# Patient Record
Sex: MALE | Race: WHITE | Employment: FULL TIME | URBAN - METROPOLITAN AREA
[De-identification: names, ages, dates, MRNs, and addresses within clinical notes are randomized per-mention and may not be internally consistent; named-entity substitution may affect disease eponyms.]

---

## 2017-07-17 ENCOUNTER — HOSPITAL ENCOUNTER (OUTPATIENT)
Dept: MRI IMAGING | Age: 52
Discharge: HOME | End: 2017-07-17
Attending: NEUROLOGICAL SURGERY
Payer: COMMERCIAL

## 2017-07-17 VITALS — BODY MASS INDEX: 27.18 KG/M2 | WEIGHT: 206 LBS

## 2017-07-17 DIAGNOSIS — D32.9 MENINGIOMA (HCC): ICD-10-CM

## 2017-07-17 PROCEDURE — 70553 MRI BRAIN STEM W/O & W/DYE: CPT

## 2017-07-17 PROCEDURE — A9577 INJ MULTIHANCE: HCPCS | Performed by: NEUROLOGICAL SURGERY

## 2017-07-17 PROCEDURE — 74011250636 HC RX REV CODE- 250/636: Performed by: NEUROLOGICAL SURGERY

## 2017-07-17 RX ORDER — SODIUM CHLORIDE 0.9 % (FLUSH) 0.9 %
10 SYRINGE (ML) INJECTION
Status: COMPLETED | OUTPATIENT
Start: 2017-07-17 | End: 2017-07-17

## 2017-07-17 RX ADMIN — Medication 10 ML: at 10:20

## 2017-07-17 RX ADMIN — GADOBENATE DIMEGLUMINE 20 ML: 529 INJECTION, SOLUTION INTRAVENOUS at 10:20

## 2017-07-31 NOTE — PROGRESS NOTES
Pt showed up here at OP rad area today and stated he talked with his dr about tingling and numbness  In his hand and complained of some hardening of the vein that was used for IV contrast for his mri 2 weeks ago  I called RAD and he agreed with his doctors diagnosis and to continue watch and do  warm compresses- there was no redness, swelling, bruising, or heat to area. - nss  OMARI Driver looked at area in ER triage since patient requested someone look at the area

## 2018-06-08 ENCOUNTER — HOSPITAL ENCOUNTER (OUTPATIENT)
Dept: MRI IMAGING | Age: 53
Discharge: HOME | End: 2018-06-08
Attending: UROLOGY
Payer: COMMERCIAL

## 2018-06-08 DIAGNOSIS — R97.20 ELEVATED PSA: ICD-10-CM

## 2018-06-08 LAB — CREAT BLD-MCNC: 1.2 MG/DL (ref 0.8–1.5)

## 2018-06-08 PROCEDURE — 82565 ASSAY OF CREATININE: CPT

## 2018-06-08 PROCEDURE — 74011000258 HC RX REV CODE- 258: Performed by: UROLOGY

## 2018-06-08 PROCEDURE — A9577 INJ MULTIHANCE: HCPCS | Performed by: UROLOGY

## 2018-06-08 PROCEDURE — 72197 MRI PELVIS W/O & W/DYE: CPT

## 2018-06-08 PROCEDURE — 74011250636 HC RX REV CODE- 250/636: Performed by: UROLOGY

## 2018-06-08 RX ORDER — SODIUM CHLORIDE 0.9 % (FLUSH) 0.9 %
10 SYRINGE (ML) INJECTION
Status: COMPLETED | OUTPATIENT
Start: 2018-06-08 | End: 2018-06-08

## 2018-06-08 RX ADMIN — Medication 10 ML: at 10:22

## 2018-06-08 RX ADMIN — SODIUM CHLORIDE 100 ML: 900 INJECTION, SOLUTION INTRAVENOUS at 10:22

## 2018-06-08 RX ADMIN — GADOBENATE DIMEGLUMINE 10 ML: 529 INJECTION, SOLUTION INTRAVENOUS at 10:22

## 2018-06-08 NOTE — PROGRESS NOTES
Jean Paul Castelan, let patient know there are 2 areas in the prostate that are suspicious for prostate cancer on the new mri. I recommend biopsy of the areas in the OR with [facility]. Fredy Watt,  If patient desires, this could be added to the 2 I already have next Wednesday.

## 2018-06-12 ENCOUNTER — ANESTHESIA EVENT (OUTPATIENT)
Dept: SURGERY | Age: 53
End: 2018-06-12
Payer: COMMERCIAL

## 2018-06-12 RX ORDER — SODIUM CHLORIDE 0.9 % (FLUSH) 0.9 %
5-10 SYRINGE (ML) INJECTION AS NEEDED
Status: CANCELLED | OUTPATIENT
Start: 2018-06-12

## 2018-06-12 RX ORDER — SODIUM CHLORIDE 0.9 % (FLUSH) 0.9 %
5-10 SYRINGE (ML) INJECTION EVERY 8 HOURS
Status: CANCELLED | OUTPATIENT
Start: 2018-06-12

## 2018-06-13 ENCOUNTER — HOSPITAL ENCOUNTER (OUTPATIENT)
Age: 53
Setting detail: OUTPATIENT SURGERY
Discharge: HOME | End: 2018-06-13
Attending: UROLOGY | Admitting: UROLOGY
Payer: COMMERCIAL

## 2018-06-13 ENCOUNTER — ANESTHESIA (OUTPATIENT)
Dept: SURGERY | Age: 53
End: 2018-06-13
Payer: COMMERCIAL

## 2018-06-13 VITALS
TEMPERATURE: 97.9 F | DIASTOLIC BLOOD PRESSURE: 73 MMHG | HEIGHT: 73 IN | SYSTOLIC BLOOD PRESSURE: 126 MMHG | BODY MASS INDEX: 27.2 KG/M2 | WEIGHT: 205.25 LBS | OXYGEN SATURATION: 100 % | HEART RATE: 74 BPM | RESPIRATION RATE: 16 BRPM

## 2018-06-13 LAB
ANION GAP SERPL CALC-SCNC: 9 MMOL/L (ref 7–16)
BUN SERPL-MCNC: 19 MG/DL (ref 6–23)
CALCIUM SERPL-MCNC: 9.1 MG/DL (ref 8.3–10.4)
CHLORIDE SERPL-SCNC: 109 MMOL/L (ref 98–107)
CO2 SERPL-SCNC: 25 MMOL/L (ref 21–32)
CREAT SERPL-MCNC: 1.2 MG/DL (ref 0.8–1.5)
ERYTHROCYTE [DISTWIDTH] IN BLOOD BY AUTOMATED COUNT: 12.6 % (ref 11.9–14.6)
GLUCOSE SERPL-MCNC: 116 MG/DL (ref 65–100)
HCT VFR BLD AUTO: 41 % (ref 41.1–50.3)
HGB BLD-MCNC: 14.1 G/DL (ref 13.6–17.2)
MCH RBC QN AUTO: 30 PG (ref 26.1–32.9)
MCHC RBC AUTO-ENTMCNC: 34.4 G/DL (ref 31.4–35)
MCV RBC AUTO: 87.2 FL (ref 79.6–97.8)
PLATELET # BLD AUTO: 288 K/UL (ref 150–450)
PMV BLD AUTO: 10.5 FL (ref 10.8–14.1)
POTASSIUM SERPL-SCNC: 4 MMOL/L (ref 3.5–5.1)
RBC # BLD AUTO: 4.7 M/UL (ref 4.23–5.67)
SODIUM SERPL-SCNC: 143 MMOL/L (ref 136–145)
WBC # BLD AUTO: 5.2 K/UL (ref 4.3–11.1)

## 2018-06-13 PROCEDURE — 77030032490 HC SLV COMPR SCD KNE COVD -B: Performed by: UROLOGY

## 2018-06-13 PROCEDURE — 76210000021 HC REC RM PH II 0.5 TO 1 HR: Performed by: UROLOGY

## 2018-06-13 PROCEDURE — 74011250636 HC RX REV CODE- 250/636: Performed by: ANESTHESIOLOGY

## 2018-06-13 PROCEDURE — 74011250636 HC RX REV CODE- 250/636

## 2018-06-13 PROCEDURE — 88305 TISSUE EXAM BY PATHOLOGIST: CPT | Performed by: UROLOGY

## 2018-06-13 PROCEDURE — 77030020143 HC AIRWY LARYN INTUB CGAS -A: Performed by: ANESTHESIOLOGY

## 2018-06-13 PROCEDURE — 80048 BASIC METABOLIC PNL TOTAL CA: CPT | Performed by: UROLOGY

## 2018-06-13 PROCEDURE — 76210000006 HC OR PH I REC 0.5 TO 1 HR: Performed by: UROLOGY

## 2018-06-13 PROCEDURE — 85027 COMPLETE CBC AUTOMATED: CPT | Performed by: UROLOGY

## 2018-06-13 PROCEDURE — 74011250636 HC RX REV CODE- 250/636: Performed by: UROLOGY

## 2018-06-13 PROCEDURE — 74011000250 HC RX REV CODE- 250

## 2018-06-13 PROCEDURE — 76060000033 HC ANESTHESIA 1 TO 1.5 HR: Performed by: UROLOGY

## 2018-06-13 PROCEDURE — 77030003481 HC NDL BIOP GUN BARD -B: Performed by: UROLOGY

## 2018-06-13 PROCEDURE — 77030020782 HC GWN BAIR PAWS FLX 3M -B: Performed by: ANESTHESIOLOGY

## 2018-06-13 PROCEDURE — 76010000138 HC OR TIME 0.5 TO 1 HR: Performed by: UROLOGY

## 2018-06-13 RX ORDER — SODIUM CHLORIDE 0.9 % (FLUSH) 0.9 %
5-10 SYRINGE (ML) INJECTION AS NEEDED
Status: DISCONTINUED | OUTPATIENT
Start: 2018-06-13 | End: 2018-06-13 | Stop reason: HOSPADM

## 2018-06-13 RX ORDER — HYDROMORPHONE HYDROCHLORIDE 2 MG/ML
0.5 INJECTION, SOLUTION INTRAMUSCULAR; INTRAVENOUS; SUBCUTANEOUS
Status: DISCONTINUED | OUTPATIENT
Start: 2018-06-13 | End: 2018-06-13 | Stop reason: HOSPADM

## 2018-06-13 RX ORDER — FENTANYL CITRATE 50 UG/ML
INJECTION, SOLUTION INTRAMUSCULAR; INTRAVENOUS AS NEEDED
Status: DISCONTINUED | OUTPATIENT
Start: 2018-06-13 | End: 2018-06-13 | Stop reason: HOSPADM

## 2018-06-13 RX ORDER — MIDAZOLAM HYDROCHLORIDE 1 MG/ML
2 INJECTION, SOLUTION INTRAMUSCULAR; INTRAVENOUS
Status: DISCONTINUED | OUTPATIENT
Start: 2018-06-13 | End: 2018-06-13 | Stop reason: HOSPADM

## 2018-06-13 RX ORDER — NALBUPHINE HYDROCHLORIDE 20 MG/ML
5 INJECTION, SOLUTION INTRAMUSCULAR; INTRAVENOUS; SUBCUTANEOUS
Status: DISCONTINUED | OUTPATIENT
Start: 2018-06-13 | End: 2018-06-13 | Stop reason: HOSPADM

## 2018-06-13 RX ORDER — SODIUM CHLORIDE, SODIUM LACTATE, POTASSIUM CHLORIDE, CALCIUM CHLORIDE 600; 310; 30; 20 MG/100ML; MG/100ML; MG/100ML; MG/100ML
75 INJECTION, SOLUTION INTRAVENOUS CONTINUOUS
Status: DISCONTINUED | OUTPATIENT
Start: 2018-06-13 | End: 2018-06-13 | Stop reason: HOSPADM

## 2018-06-13 RX ORDER — DEXAMETHASONE SODIUM PHOSPHATE 4 MG/ML
INJECTION, SOLUTION INTRA-ARTICULAR; INTRALESIONAL; INTRAMUSCULAR; INTRAVENOUS; SOFT TISSUE AS NEEDED
Status: DISCONTINUED | OUTPATIENT
Start: 2018-06-13 | End: 2018-06-13 | Stop reason: HOSPADM

## 2018-06-13 RX ORDER — SODIUM CHLORIDE, SODIUM LACTATE, POTASSIUM CHLORIDE, CALCIUM CHLORIDE 600; 310; 30; 20 MG/100ML; MG/100ML; MG/100ML; MG/100ML
100 INJECTION, SOLUTION INTRAVENOUS CONTINUOUS
Status: DISCONTINUED | OUTPATIENT
Start: 2018-06-13 | End: 2018-06-13 | Stop reason: HOSPADM

## 2018-06-13 RX ORDER — LIDOCAINE HYDROCHLORIDE 20 MG/ML
INJECTION, SOLUTION EPIDURAL; INFILTRATION; INTRACAUDAL; PERINEURAL AS NEEDED
Status: DISCONTINUED | OUTPATIENT
Start: 2018-06-13 | End: 2018-06-13 | Stop reason: HOSPADM

## 2018-06-13 RX ORDER — EPHEDRINE SULFATE 50 MG/ML
INJECTION, SOLUTION INTRAVENOUS AS NEEDED
Status: DISCONTINUED | OUTPATIENT
Start: 2018-06-13 | End: 2018-06-13 | Stop reason: HOSPADM

## 2018-06-13 RX ORDER — ONDANSETRON 2 MG/ML
INJECTION INTRAMUSCULAR; INTRAVENOUS AS NEEDED
Status: DISCONTINUED | OUTPATIENT
Start: 2018-06-13 | End: 2018-06-13 | Stop reason: HOSPADM

## 2018-06-13 RX ORDER — PROPOFOL 10 MG/ML
INJECTION, EMULSION INTRAVENOUS AS NEEDED
Status: DISCONTINUED | OUTPATIENT
Start: 2018-06-13 | End: 2018-06-13 | Stop reason: HOSPADM

## 2018-06-13 RX ORDER — FLUMAZENIL 0.1 MG/ML
0.2 INJECTION INTRAVENOUS
Status: DISCONTINUED | OUTPATIENT
Start: 2018-06-13 | End: 2018-06-13 | Stop reason: HOSPADM

## 2018-06-13 RX ORDER — CIPROFLOXACIN 2 MG/ML
400 INJECTION, SOLUTION INTRAVENOUS ONCE
Status: COMPLETED | OUTPATIENT
Start: 2018-06-13 | End: 2018-06-13

## 2018-06-13 RX ORDER — LIDOCAINE HYDROCHLORIDE 10 MG/ML
0.1 INJECTION INFILTRATION; PERINEURAL AS NEEDED
Status: DISCONTINUED | OUTPATIENT
Start: 2018-06-13 | End: 2018-06-13 | Stop reason: HOSPADM

## 2018-06-13 RX ORDER — OXYCODONE HYDROCHLORIDE 5 MG/1
5 TABLET ORAL
Status: DISCONTINUED | OUTPATIENT
Start: 2018-06-13 | End: 2018-06-13 | Stop reason: HOSPADM

## 2018-06-13 RX ORDER — NALOXONE HYDROCHLORIDE 0.4 MG/ML
0.1 INJECTION, SOLUTION INTRAMUSCULAR; INTRAVENOUS; SUBCUTANEOUS
Status: DISCONTINUED | OUTPATIENT
Start: 2018-06-13 | End: 2018-06-13 | Stop reason: HOSPADM

## 2018-06-13 RX ADMIN — PROPOFOL 200 MG: 10 INJECTION, EMULSION INTRAVENOUS at 12:39

## 2018-06-13 RX ADMIN — SODIUM CHLORIDE, SODIUM LACTATE, POTASSIUM CHLORIDE, AND CALCIUM CHLORIDE 100 ML/HR: 600; 310; 30; 20 INJECTION, SOLUTION INTRAVENOUS at 10:28

## 2018-06-13 RX ADMIN — EPHEDRINE SULFATE 10 MG: 50 INJECTION, SOLUTION INTRAVENOUS at 13:05

## 2018-06-13 RX ADMIN — FENTANYL CITRATE 50 MCG: 50 INJECTION, SOLUTION INTRAMUSCULAR; INTRAVENOUS at 12:39

## 2018-06-13 RX ADMIN — ONDANSETRON 4 MG: 2 INJECTION INTRAMUSCULAR; INTRAVENOUS at 12:59

## 2018-06-13 RX ADMIN — EPHEDRINE SULFATE 10 MG: 50 INJECTION, SOLUTION INTRAVENOUS at 12:54

## 2018-06-13 RX ADMIN — DEXAMETHASONE SODIUM PHOSPHATE 4 MG: 4 INJECTION, SOLUTION INTRA-ARTICULAR; INTRALESIONAL; INTRAMUSCULAR; INTRAVENOUS; SOFT TISSUE at 12:59

## 2018-06-13 RX ADMIN — CIPROFLOXACIN 400 MG: 2 INJECTION, SOLUTION INTRAVENOUS at 12:33

## 2018-06-13 RX ADMIN — LIDOCAINE HYDROCHLORIDE 80 MG: 20 INJECTION, SOLUTION EPIDURAL; INFILTRATION; INTRACAUDAL; PERINEURAL at 12:39

## 2018-06-13 NOTE — BRIEF OP NOTE
BRIEF OPERATIVE NOTE    Date of Procedure: 6/13/2018   Preoperative Diagnosis: Elevated PSA [R97.20] and mri prostate lesion  Postoperative Diagnosis: same  Procedure(s):  MRI FUSION PROSTATE BIOPSY / TRANSRECTAL ULTRASOUND  Surgeon(s) and Role:     * Shahab Canales MD - Primary         Surgical Assistant: none    Surgical Staff:  Circ-1: Jose Cisneros RN; Jade Monteiro RN  Circ-2: Jade Monteiro RN  Circ-Relief: Kalie Staton; Marivel Irving  Event Time In   Incision Start 1250   Incision Close 1324     Anesthesia: General   Estimated Blood Loss: minimal  Specimens:   ID Type Source Tests Collected by Time Destination   1 : Left base mri lesion Preservative Prostate  Shahab Canales MD 6/13/2018  Pathology   2 : left mid mri lesion  Preservative Prostate  Shahab Canales MD 6/13/2018 1302 Pathology   3 : right mid mri lesion Preservative Prostate  Shahab Canales MD 6/13/2018 1302 Pathology   4 : left apex    Shahab Canales MD 6/13/2018 1318 Pathology   5 : right apex Preservative Prostate  Shahab Canales MD 6/13/2018 1320 Pathology   6 : right base Preservative Shanique Canales MD 6/13/2018 1320 Pathology      Findings: see dictation   Complications: none apparent  Implants: * No implants in log *

## 2018-06-13 NOTE — IP AVS SNAPSHOT
Tres Krishnamurthy 
 
 
 [address] [facility] [address] 
[phone] Patient: Edna Klinefelter. MRN: [id number] MUP:[birth year] A check dominic indicates which time of day the medication should be taken. My Medications CONTINUE taking these medications Instructions Each Dose to Equal  
 Morning Noon Evening Bedtime ANTARA 130 mg capsule Generic drug:  fenofibrate micronized Your last dose was: Your next dose is: Take 130 mg by mouth every morning. 130 mg  
    
   
   
   
  
 aspirin, buffered 81 mg Tab Your last dose was: Your next dose is: Take 81 mg by mouth daily. Pt has been holding 81 mg  
    
   
   
   
  
 ciprofloxacin HCl 500 mg tablet Commonly known as:  CIPRO Your last dose was: Your next dose is: Take 1 Tab by mouth two (2) times a day for 14 days. 500 mg CLARITIN 10 mg tablet Generic drug:  loratadine Your last dose was: Your next dose is: Take 10 mg by mouth daily as needed for Allergies. 10 mg  
    
   
   
   
  
 clonazePAM 1 mg tablet Commonly known as:  Waynard Beltran Your last dose was: Your next dose is: Take 1 mg by mouth nightly. 1 mg EXCEDRIN EXTRA STRENGTH 250-250-65 mg per tablet Generic drug:  aspirin-acetaminophen-caffeine Your last dose was: Your next dose is: Take 1 Tab by mouth every six (6) hours as needed for Pain. 1 Tab FIORICET -40 mg per tablet Generic drug:  butalbital-acetaminophen-caffeine Your last dose was: Your next dose is: Take 1 Tab by mouth as needed for Pain. 1 Tab FLONASE 50 mcg/actuation nasal spray Generic drug:  fluticasone Your last dose was: Your next dose is: 2 Sprays as needed. 2 Spray  
    
   
   
   
  
 ibuprofen 200 mg tablet Commonly known as:  MOTRIN Your last dose was: Your next dose is: Take  by mouth as needed for Pain.  
     
   
   
   
  
 losartan 50 mg tablet Commonly known as:  COZAAR Your last dose was: Your next dose is: Take 50 mg by mouth daily. 50 mg  
    
   
   
   
  
 metoprolol succinate 25 mg XL tablet Commonly known as:  TOPROL-XL Your last dose was: Your next dose is: Take 12.5 mg by mouth daily. 12.5 mg  
    
   
   
   
  
 montelukast 10 mg tablet Commonly known as:  SINGULAIR Your last dose was: Your next dose is: Take 10 mg by mouth daily. 10 mg  
    
   
   
   
  
 MULTIVITAMIN PO Your last dose was: Your next dose is: Take 1 Tab by mouth every morning. 1 Tab OTHER(NON-FORMULARY) Your last dose was: Your next dose is:    
   
   
 1 Cap every morning. RESERVOR - ina extract 1 Cap PROBIOTIC PO Your last dose was: Your next dose is: Take 1 Cap by mouth every other day. Takes 1 capsule three times a week. 1 Cap  
    
   
   
   
  
 saw palmetto 160 mg Cap Your last dose was: Your next dose is: Take  by mouth daily. SYNTHROID 150 mcg tablet Generic drug:  levothyroxine Your last dose was: Your next dose is: Take  by mouth Daily (before breakfast).

## 2018-06-13 NOTE — DISCHARGE INSTRUCTIONS
Prostate Biopsy and Ultrasound: About This Test  What is it? A prostate biopsy is a type of test. Your doctor takes small tissue samples from your prostate gland. Then another doctor looks at the tissue under a microscope to see if there are cancer cells. This test is done by a doctor who specializes in men's genital and urinary problems (urologist). It can be done in your doctor's office, a day surgery clinic, or a hospital operating room. To get the tissue samples from the prostate, the doctor inserts a thin needle through the rectum, the urethra, or the area between the anus and scrotum (perineum). The most common method is through the rectum. Your doctor may use ultrasound to help guide the needle. Why is this test done? You may need a prostate biopsy if your doctor found something of concern during a digital rectal exam. Or you may need it if a blood test showed a high level of prostate-specific antigen (PSA). A biopsy can help find out if you have prostate cancer. How can you prepare for this test?  Before you have a prostate biopsy, tell your doctor if:  · You are taking any medicines or using any herbal supplements. · You are allergic to any medicines. · You have had bleeding problems, or you take aspirin or some other blood thinner. What happens before the test?  · You may need to have an enema before the test.  · You will need to take off all or most of your clothes. You will be given a cloth or paper gown to use during the test.  · You may be given a sedative through a vein (IV) in your arm. The sedative will help you relax and stay still. What happens during the test?  Through the rectum  · You may be asked to kneel, lie on your side, or lie on your back. · Your doctor may inject an anesthetic around the prostate to numb the area before the sample is taken. · Ultrasound is often used to guide the needle to the correct spot. · Your doctor may choose to use a needle guide for the biopsy. He or she will attach the guide to a finger. Your doctor will insert the finger into your rectum. · The needle will enter the prostate and take 6 to 12 samples. Through the urethra  · You will lie on your back. Your feet will rest in stirrups. · You will get anesthesia. The anesthesia may make you sleep. Or it may just numb the area being worked on.  · Your doctor will insert a lighted scope (cystoscope) into your urethra. The scope allows your doctor to look directly at the prostate. Your doctor will pass a cutting loop through the scope to remove samples of prostate tissue. Through the perineum  · You will lie on an exam table either on your side or on your back with your knees bent. You will get anesthesia. The anesthesia may make you sleep. Or it may just numb the area being worked on.  · Your doctor will make a small cut in your perineum. Then he or she will insert a finger into the rectum to hold the prostate. · Your doctor will then insert the needle through the cut and into the prostate. · The needle collects samples of tissue and is then pulled out. What else should you know about this test?  · A prostate biopsy has a slight risk of causing problems such as infection or bleeding. · If the biopsy went through your rectum, you may have a small amount of bleeding from your rectum for 2 to 3 days after the biopsy. · You may have a little pain in your pelvic area. You may also have a little blood in your urine for 1 to 5 days. · You may have some blood in your semen for a week or longer. How long will the test take? · This test will take about 15 to 45 minutes. What happens after the test?  Your doctor will tell you what to expect and watch for after your test. In general:  · If you have anesthesia that makes you sleep, you will be in a recovery room for a few hours. You will need someone to drive you home. You may feel tired for the rest of the day.   · You will probably be able to go home right away.  · If your doctor had you stop taking any medicine for the biopsy, ask him or her when you can start taking it again. If you were given antibiotics, take them as directed. · Do not do heavy work or exercise for 4 hours after the test.  · Your doctor will tell you how long it may take to get your results back. Follow-up care is a key part of your treatment and safety. Be sure to make and go to all appointments, and call your doctor if you are having problems. It's also a good idea to keep a list of the medicines you take. Ask your doctor when you can expect to have your test results. Where can you learn more? Go to http://chetna-radha.info/. Enter Y504 in the search box to learn more about \"Prostate Biopsy and Ultrasound: About This Test.\"  Current as of: May 12, 2017  Content Version: 11.4  © 2637-0416 [employer]. Care instructions adapted under license by [employer] (which disclaims liability or warranty for this information). If you have questions about a medical condition or this instruction, always ask your healthcare professional. [place] [address] any warranty or liability for your use of this information. DIET  · Clear liquids until no nausea or vomiting; then light diet for the first day. · Advance to regular diet on second day, unless your doctor orders otherwise. · If nausea and vomiting continues, call your doctor. PAIN  · Take pain medication as directed by your doctor. · Call your doctor if pain is NOT relieved by medication. AFTER ANESTHESIA   · For the first 24 hours: DO NOT Drive, Drink alcoholic beverages, or Make important decisions. · Be aware of dizziness following anesthesia and while taking pain medication.      APPOINTMENT DATE/ TIME   His office will call you     YOUR DOCTOR'S PHONE NUMBER [phone]      DISCHARGE SUMMARY from Nurse    PATIENT INSTRUCTIONS:    After general anesthesia or intravenous sedation, for 24 hours or while taking prescription Narcotics:  · Limit your activities  · Do not drive and operate hazardous machinery  · Do not make important personal or business decisions  · Do  not drink alcoholic beverages  · If you have not urinated within 8 hours after discharge, please contact your surgeon on call. *  Please give a list of your current medications to your Primary Care Provider. *  Please update this list whenever your medications are discontinued, doses are      changed, or new medications (including over-the-counter products) are added. *  Please carry medication information at all times in case of emergency situations. These are general instructions for a healthy lifestyle:    No smoking/ No tobacco products/ Avoid exposure to second hand smoke    Surgeon General's Warning:  Quitting smoking now greatly reduces serious risk to your health. Obesity, smoking, and sedentary lifestyle greatly increases your risk for illness    A healthy diet, regular physical exercise & weight monitoring are important for maintaining a healthy lifestyle    You may be retaining fluid if you have a history of heart failure or if you experience any of the following symptoms:  Weight gain of 3 pounds or more overnight or 5 pounds in a week, increased swelling in our hands or feet or shortness of breath while lying flat in bed. Please call your doctor as soon as you notice any of these symptoms; do not wait until your next office visit. Recognize signs and symptoms of STROKE:    F-face looks uneven    A-arms unable to move or move unevenly    S-speech slurred or non-existent    T-time-call 911 as soon as signs and symptoms begin-DO NOT go       Back to bed or wait to see if you get better-TIME IS BRAIN.

## 2018-06-13 NOTE — OP NOTES
[facility]  OPERATIVE REPORT    Louisa Eubanks  MR#: [id number]  : 1965  ACCOUNT #: [de-identified]   DATE OF SERVICE: 2018    SURGEON:  Taiwo Escobar MD    ASSISTANT:  None. PREOPERATIVE DIAGNOSIS:  Elevated PSA and prostate lesion on MRI. POSTOPERATIVE DIAGNOSIS:  Elevated PSA and prostate lesion on MRI. PROCEDURE PERFORMED:  MRI fusion ultrasound-guided prostate biopsy using UroNav. ANESTHESIA:  General.    ESTIMATED BLOOD LOSS:  Minimal.    COMPLICATIONS:  None apparent. 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 He had a negative prostate biopsy 10/20/2016. His PSA at that time was approximately 5.4. More recently it had elevated in 2018 to 5.7, leading to an MRI of the prostate being performed on 2018 which showed a PI-RADS 4 lesion as well as 2 PI-RADS 3 lesions, and secondary to that he was taken to the operating room today for the above-named procedure. FINDINGS:  The patient's prostate was in the 30-35 g range. TECHNIQUE:  The patient was taken to the operating room and placed in supine position. Anesthesia was induced via anesthesiology service. He was repositioned in the lithotomy position and prepped and draped in sterile surgical fashion with the genitalia in a sterile field. A digital rectal exam was performed with no nodularity of the prostate being palpable. I then inserted the transrectal ultrasound probe, scanned the prostate for the UroNav device, and once it was ready, we took biopsies of the 3 areas of question. These were from the left and right mid areas as well as the left base. These cores were labeled MRI lesion 1, 2 and 3. I then took 3 more biopsies, finishing up a sextant pattern. These were from the right base and left and right apex. After the 6 biopsies had been taken, the probe was removed.   The patient was taken to the PACU in stable condition. Plan will be for him to follow up to go over the biopsy results when they are available. SPECIMENS:  Prostate biopsies. IMPLANTS:  None.       MD ELAINE Cortes / Michelet Dawkins  D: 06/13/2018 13:35     T: 06/13/2018 14:22  JOB #: [id number]

## 2018-06-13 NOTE — ANESTHESIA POSTPROCEDURE EVALUATION
Post-Anesthesia Evaluation and Assessment    Patient: Belen Peres. MRN: [id number]  SSN: [id number]    YOB: 1965  Age: 48 y.o. Sex: male       Cardiovascular Function/Vital Signs  Visit Vitals    /68    Pulse 75    Temp 36.6 °C (97.9 °F)    Resp 16    Ht 6' 1\" (1.854 m)    Wt 93.1 kg (205 lb 4 oz)    SpO2 100%    BMI 27.08 kg/m2       Patient is status post general anesthesia for Procedure(s):  MRI FUSION PROSTATE BIOPSY / TRANSRECTAL ULTRASOUND. Nausea/Vomiting: None    Postoperative hydration reviewed and adequate. Pain:  Pain Scale 1: Visual (06/13/18 1334)  Pain Intensity 1: 0 (06/13/18 1334)   Managed    Neurological Status:   Neuro (WDL): Exceptions to WDL (06/13/18 1334)  Neuro  Neurologic State: Drowsy;Sleeping (06/13/18 1334)   At baseline    Mental Status and Level of Consciousness: Arousable    Pulmonary Status:   O2 Device: Nasal cannula (06/13/18 1334)   Adequate oxygenation and airway patent    Complications related to anesthesia: None    Post-anesthesia assessment completed.  No concerns    Signed By: Gayle Rodriguez MD     June 13, 2018

## 2018-06-13 NOTE — ANESTHESIA PREPROCEDURE EVALUATION
Anesthetic History   No history of anesthetic complications            Review of Systems / Medical History  Patient summary reviewed and pertinent labs reviewed    Pulmonary        Sleep apnea: No treatment           Neuro/Psych             Comments: H/o crainiotomy for meningioma in 2010 Cardiovascular    Hypertension: well controlled          Hyperlipidemia    Exercise tolerance: >4 METS  Comments: Denies recent CP, SOB or Palpitations   GI/Hepatic/Renal     GERD: well controlled           Endo/Other      Hypothyroidism: well controlled  Arthritis     Other Findings            Physical Exam    Airway  Mallampati: II  TM Distance: 4 - 6 cm  Neck ROM: normal range of motion   Mouth opening: Normal     Cardiovascular  Regular rate and rhythm,  S1 and S2 normal,  no murmur, click, rub, or gallop             Dental  No notable dental hx       Pulmonary  Breath sounds clear to auscultation               Abdominal  GI exam deferred       Other Findings            Anesthetic Plan    ASA: 2  Anesthesia type: general          Induction: Intravenous  Anesthetic plan and risks discussed with: Patient and Spouse

## 2018-06-13 NOTE — IP AVS SNAPSHOT
[address] [address] 
[phone] Patient: Chani Noriega. MRN: [id number] [facility]:[birth year] About your hospitalization You were admitted on:  June 13, 2018 You last received care in the:  [facility] PACU You were discharged on:  June 13, 2018 Why you were hospitalized Your primary diagnosis was:  Not on File Follow-up Information Follow up With Details Comments Contact Info Vielka Garcia, [address] [facility] 
[phone] Les Buckner MD  his [facility] will call you [facility] [address] [address] 
[phone] Discharge Orders None A check odminic indicates which time of day the medication should be taken. My Medications CONTINUE taking these medications Instructions Each Dose to Equal  
 Morning Noon Evening Bedtime ANTARA 130 mg capsule Generic drug:  fenofibrate micronized Your last dose was: Your next dose is: Take 130 mg by mouth every morning. 130 mg  
    
   
   
   
  
 aspirin, buffered 81 mg Tab Your last dose was: Your next dose is: Take 81 mg by mouth daily. Pt has been holding 81 mg  
    
   
   
   
  
 ciprofloxacin HCl 500 mg tablet Commonly known as:  CIPRO Your last dose was: Your next dose is: Take 1 Tab by mouth two (2) times a day for 14 days. 500 mg CLARITIN 10 mg tablet Generic drug:  loratadine Your last dose was: Your next dose is: Take 10 mg by mouth daily as needed for Allergies. 10 mg  
    
   
   
   
  
 clonazePAM 1 mg tablet Commonly known as:  Evlyn Ángel Your last dose was: Your next dose is: Take 1 mg by mouth nightly. 1 mg EXCEDRIN EXTRA STRENGTH 250-250-65 mg per tablet Generic drug:  aspirin-acetaminophen-caffeine Your last dose was: Your next dose is: Take 1 Tab by mouth every six (6) hours as needed for Pain. 1 Tab FIORICET -40 mg per tablet Generic drug:  butalbital-acetaminophen-caffeine Your last dose was: Your next dose is: Take 1 Tab by mouth as needed for Pain. 1 Tab FLONASE 50 mcg/actuation nasal spray Generic drug:  fluticasone Your last dose was: Your next dose is: 2 Sprays as needed. 2 Spray  
    
   
   
   
  
 ibuprofen 200 mg tablet Commonly known as:  MOTRIN Your last dose was: Your next dose is: Take  by mouth as needed for Pain.  
     
   
   
   
  
 losartan 50 mg tablet Commonly known as:  COZAAR Your last dose was: Your next dose is: Take 50 mg by mouth daily. 50 mg  
    
   
   
   
  
 metoprolol succinate 25 mg XL tablet Commonly known as:  TOPROL-XL Your last dose was: Your next dose is: Take 12.5 mg by mouth daily. 12.5 mg  
    
   
   
   
  
 montelukast 10 mg tablet Commonly known as:  SINGULAIR Your last dose was: Your next dose is: Take 10 mg by mouth daily. 10 mg  
    
   
   
   
  
 MULTIVITAMIN PO Your last dose was: Your next dose is: Take 1 Tab by mouth every morning. 1 Tab OTHER(NON-FORMULARY) Your last dose was: Your next dose is:    
   
   
 1 Cap every morning. RESERVOR - ina extract 1 Cap PROBIOTIC PO Your last dose was: Your next dose is: Take 1 Cap by mouth every other day. Takes 1 capsule three times a week. 1 Cap  
    
   
   
   
  
 saw palmetto 160 mg Cap Your last dose was: Your next dose is: Take  by mouth daily. SYNTHROID 150 mcg tablet Generic drug:  levothyroxine Your last dose was: Your next dose is: Take  by mouth Daily (before breakfast). Discharge Instructions Prostate Biopsy and Ultrasound: About This Test 
What is it? A prostate biopsy is a type of test. Your doctor takes small tissue samples from your prostate gland. Then another doctor looks at the tissue under a microscope to see if there are cancer cells. This test is done by a doctor who specializes in men's genital and urinary problems (urologist). It can be done in your doctor's office, a day surgery clinic, or a hospital operating room. To get the tissue samples from the prostate, the doctor inserts a thin needle through the rectum, the urethra, or the area between the anus and scrotum (perineum). The most common method is through the rectum. Your doctor may use ultrasound to help guide the needle. Why is this test done? You may need a prostate biopsy if your doctor found something of concern during a digital rectal exam. Or you may need it if a blood test showed a high level of prostate-specific antigen (PSA). A biopsy can help find out if you have prostate cancer. How can you prepare for this test? 
Before you have a prostate biopsy, tell your doctor if: 
· You are taking any medicines or using any herbal supplements. · You are allergic to any medicines. · You have had bleeding problems, or you take aspirin or some other blood thinner. What happens before the test? 
· You may need to have an enema before the test. 
· You will need to take off all or most of your clothes. You will be given a cloth or paper gown to use during the test. 
· You may be given a sedative through a vein (IV) in your arm. The sedative will help you relax and stay still. What happens during the test? 
Through the rectum · You may be asked to kneel, lie on your side, or lie on your back. · Your doctor may inject an anesthetic around the prostate to numb the area before the sample is taken. · Ultrasound is often used to guide the needle to the correct spot. · Your doctor may choose to use a needle guide for the biopsy. He or she will attach the guide to a finger. Your doctor will insert the finger into your rectum. · The needle will enter the prostate and take 6 to 12 samples. Through the urethra · You will lie on your back. Your feet will rest in stirrups. · You will get anesthesia. The anesthesia may make you sleep. Or it may just numb the area being worked on. 
· Your doctor will insert a lighted scope (cystoscope) into your urethra. The scope allows your doctor to look directly at the prostate. Your doctor will pass a cutting loop through the scope to remove samples of prostate tissue. Through the perineum · You will lie on an exam table either on your side or on your back with your knees bent. You will get anesthesia. The anesthesia may make you sleep. Or it may just numb the area being worked on. 
· Your doctor will make a small cut in your perineum. Then he or she will insert a finger into the rectum to hold the prostate. · Your doctor will then insert the needle through the cut and into the prostate. · The needle collects samples of tissue and is then pulled out. What else should you know about this test? 
· A prostate biopsy has a slight risk of causing problems such as infection or bleeding. · If the biopsy went through your rectum, you may have a small amount of bleeding from your rectum for 2 to 3 days after the biopsy. · You may have a little pain in your pelvic area. You may also have a little blood in your urine for 1 to 5 days. · You may have some blood in your semen for a week or longer. How long will the test take? · This test will take about 15 to 45 minutes.  
What happens after the test? 
 Your doctor will tell you what to expect and watch for after your test. In general: · If you have anesthesia that makes you sleep, you will be in a recovery room for a few hours. You will need someone to drive you home. You may feel tired for the rest of the day. · You will probably be able to go home right away. · If your doctor had you stop taking any medicine for the biopsy, ask him or her when you can start taking it again. If you were given antibiotics, take them as directed. · Do not do heavy work or exercise for 4 hours after the test. 
· Your doctor will tell you how long it may take to get your results back. Follow-up care is a key part of your treatment and safety. Be sure to make and go to all appointments, and call your doctor if you are having problems. It's also a good idea to keep a list of the medicines you take. Ask your doctor when you can expect to have your test results. Where can you learn more? Go to http://chetna-radha.info/. Enter Y504 in the search box to learn more about \"Prostate Biopsy and Ultrasound: About This Test.\" Current as of: May 12, 2017 Content Version: 11.4 © 0922-1302 [employer]. Care instructions adapted under license by [employer] (which disclaims liability or warranty for this information). If you have questions about a medical condition or this instruction, always ask your healthcare professional. [address] [address] any warranty or liability for your use of this information. DIET · Clear liquids until no nausea or vomiting; then light diet for the first day. · Advance to regular diet on second day, unless your doctor orders otherwise. · If nausea and vomiting continues, call your doctor. PAIN 
· Take pain medication as directed by your doctor. · Call your doctor if pain is NOT relieved by medication. AFTER ANESTHESIA · For the first 24 hours: DO NOT Drive, Drink alcoholic beverages, or Make important decisions. · Be aware of dizziness following anesthesia and while taking pain medication. APPOINTMENT DATE/ TIME   His office will call you YOUR DOCTOR'S PHONE NUMBER [phone] DISCHARGE SUMMARY from Nurse PATIENT INSTRUCTIONS: 
 
After general anesthesia or intravenous sedation, for 24 hours or while taking prescription Narcotics: · Limit your activities · Do not drive and operate hazardous machinery · Do not make important personal or business decisions · Do  not drink alcoholic beverages · If you have not urinated within 8 hours after discharge, please contact your surgeon on call. *  Please give a list of your current medications to your Primary Care Provider. *  Please update this list whenever your medications are discontinued, doses are 
    changed, or new medications (including over-the-counter products) are added. *  Please carry medication information at all times in case of emergency situations. These are general instructions for a healthy lifestyle: No smoking/ No tobacco products/ Avoid exposure to second hand smoke Surgeon General's Warning:  Quitting smoking now greatly reduces serious risk to your health. Obesity, smoking, and sedentary lifestyle greatly increases your risk for illness A healthy diet, regular physical exercise & weight monitoring are important for maintaining a healthy lifestyle You may be retaining fluid if you have a history of heart failure or if you experience any of the following symptoms:  Weight gain of 3 pounds or more overnight or 5 pounds in a week, increased swelling in our hands or feet or shortness of breath while lying flat in bed. Please call your doctor as soon as you notice any of these symptoms; do not wait until your next office visit. Recognize signs and symptoms of STROKE: 
 
F-face looks uneven A-arms unable to move or move unevenly S-speech slurred or non-existent T-time-call 911 as soon as signs and symptoms begin-DO NOT go Back to bed or wait to see if you get better-TIME IS BRAIN. Introducing \[facility] [place] [facility]\"" & HEALTH SERVICES! Dear Rosita Andersen: Thank you for requesting a [employer] account. Our records indicate that you already have an active [employer] account. You can access your account anytime at [email or web address] Did you know that you can access your hospital and ER discharge instructions at any time in [employer]? You can also review all of your test results from your hospital stay or ER visit. Additional Information If you have questions, please visit the Frequently Asked Questions section of the [employer] website at [email or web address] Remember, [employer] is NOT to be used for urgent needs. For medical emergencies, dial 911. Now available from your iPhone and Android! Introducing Golden Patel As a [facility] patient, I wanted to make you aware of our electronic visit tool called Golden Silviofin. [employer] 24/7 allows you to connect within minutes with a medical provider 24 hours a day, seven days a week via a mobile device or tablet or logging into a secure website from your computer. You can access Golden Patel from anywhere in the [place]. A virtual visit might be right for you when you have a simple condition and feel like you just dont want to get out of bed, or cant get away from work for an appointment, when your regular [facility] provider is not available (evenings, weekends or holidays), or when youre out of town and need minor care. Electronic visits cost only $49 and if the [facility] 24/7 provider determines a prescription is needed to treat your condition, one can be electronically transmitted to a nearby pharmacy*. Please take a moment to enroll today if you have not already done so. The enrollment process is free and takes just a few minutes. To enroll, please download the [employer] 24/7 felix to your tablet or phone, or visit [email or web address] to enroll on your computer. And, as an [address] [place] [address] patient with a [employer] account, the results of your visits will be scanned into your electronic medical record and your primary care provider will be able to view the scanned results. We urge you to continue to see your regular [employer] provider for your ongoing medical care. And while your primary care provider may not be the one available when you seek a [employer]ne[employer] virtual visit, the peace of mind you get from getting a real diagnosis real time can be priceless. For more information on Qnekt, view our Frequently Asked Questions (FAQs) at [email or web address]. Sincerely, 
 
Edna Castro MD 
Chief Medical Officer [facility] Nataly Nitin *:  certain medications cannot be prescribed via [employer]nekt Unresulted tests-please follow up with your PCP on these results Procedure/Test Authorizing Provider CBC W/O DIFF Jasen Garcia MD  
 METABOLIC PANEL, Emanuel Willingham MD  
  
Providers Seen During Your Hospitalization Provider Specialty Primary office phone Jasen Garcia MD Urology [phone] Your Primary Care Physician (PCP) Primary Care Physician Office Phone Office Fax [address] , [place] [address] You are allergic to the following No active allergies Recent Documentation Height Weight BMI Smoking Status 1.854 m 93.1 kg 27.08 kg/m2 Never Smoker Emergency Contacts Name Discharge Info Relation Home Work Mobile [facility] [address] [facility] CAREGIVER [3] Spouse [3] [phone] Patient Belongings The following personal items are in your possession at time of discharge: 
  Dental Appliances: None         Home Medications: None   Jewelry: None  Clothing: Footwear, Pants, Shirt, Undergarments    Other Valuables: None  Personal Items Sent to Safe: none Please provide this summary of care documentation to your next provider. Signatures-by signing, you are acknowledging that this After Visit Summary has been reviewed with you and you have received a copy. Patient Signature:  ____________________________________________________________ Date:  ____________________________________________________________  
  
[facility] Provider Signature:  ____________________________________________________________ Date:  ____________________________________________________________

## 2018-06-15 ENCOUNTER — HOSPITAL ENCOUNTER (INPATIENT)
Age: 53
LOS: 2 days | Discharge: HOME | DRG: 863 | End: 2018-06-18
Attending: EMERGENCY MEDICINE | Admitting: UROLOGY
Payer: COMMERCIAL

## 2018-06-15 ENCOUNTER — APPOINTMENT (OUTPATIENT)
Dept: GENERAL RADIOLOGY | Age: 53
End: 2018-06-15
Payer: COMMERCIAL

## 2018-06-15 ENCOUNTER — HOSPITAL ENCOUNTER (EMERGENCY)
Age: 53
Discharge: HOME | End: 2018-06-15
Payer: COMMERCIAL

## 2018-06-15 VITALS
BODY MASS INDEX: 27.04 KG/M2 | HEIGHT: 73 IN | SYSTOLIC BLOOD PRESSURE: 108 MMHG | DIASTOLIC BLOOD PRESSURE: 52 MMHG | OXYGEN SATURATION: 96 % | WEIGHT: 204 LBS | TEMPERATURE: 100.2 F | RESPIRATION RATE: 10 BRPM | HEART RATE: 88 BPM

## 2018-06-15 DIAGNOSIS — N39.0 URINARY TRACT INFECTION WITHOUT HEMATURIA, SITE UNSPECIFIED: Primary | ICD-10-CM

## 2018-06-15 DIAGNOSIS — N39.0 URINARY TRACT INFECTION WITHOUT HEMATURIA, SITE UNSPECIFIED: ICD-10-CM

## 2018-06-15 DIAGNOSIS — R50.82 POSTOPERATIVE FEVER: Primary | ICD-10-CM

## 2018-06-15 LAB
ALBUMIN SERPL-MCNC: 3.5 G/DL (ref 3.5–5)
ALBUMIN/GLOB SERPL: 1.1 {RATIO} (ref 1.2–3.5)
ALP SERPL-CCNC: 61 U/L (ref 50–136)
ALT SERPL-CCNC: 39 U/L (ref 12–65)
ANION GAP SERPL CALC-SCNC: 10 MMOL/L (ref 7–16)
ANION GAP SERPL CALC-SCNC: 9 MMOL/L (ref 7–16)
APPEARANCE UR: ABNORMAL
AST SERPL-CCNC: 24 U/L (ref 15–37)
ATRIAL RATE: 106 BPM
BACTERIA URNS QL MICRO: 0 /HPF
BASOPHILS # BLD: 0 K/UL (ref 0–0.2)
BASOPHILS # BLD: 0 K/UL (ref 0–0.2)
BASOPHILS NFR BLD: 0 % (ref 0–2)
BASOPHILS NFR BLD: 0 % (ref 0–2)
BILIRUB SERPL-MCNC: 0.5 MG/DL (ref 0.2–1.1)
BILIRUB UR QL: NEGATIVE
BUN SERPL-MCNC: 14 MG/DL (ref 6–23)
BUN SERPL-MCNC: 24 MG/DL (ref 6–23)
CALCIUM SERPL-MCNC: 8.2 MG/DL (ref 8.3–10.4)
CALCIUM SERPL-MCNC: 8.5 MG/DL (ref 8.3–10.4)
CALCULATED P AXIS, ECG09: 66 DEGREES
CALCULATED R AXIS, ECG10: 24 DEGREES
CALCULATED T AXIS, ECG11: 42 DEGREES
CASTS URNS QL MICRO: ABNORMAL /LPF
CHLORIDE SERPL-SCNC: 104 MMOL/L (ref 98–107)
CHLORIDE SERPL-SCNC: 105 MMOL/L (ref 98–107)
CO2 SERPL-SCNC: 24 MMOL/L (ref 21–32)
CO2 SERPL-SCNC: 26 MMOL/L (ref 21–32)
COLOR UR: YELLOW
CREAT SERPL-MCNC: 1.34 MG/DL (ref 0.8–1.5)
CREAT SERPL-MCNC: 1.46 MG/DL (ref 0.8–1.5)
DIAGNOSIS, 93000: NORMAL
DIFFERENTIAL METHOD BLD: ABNORMAL
DIFFERENTIAL METHOD BLD: ABNORMAL
EOSINOPHIL # BLD: 0 K/UL (ref 0–0.8)
EOSINOPHIL # BLD: 0.1 K/UL (ref 0–0.8)
EOSINOPHIL NFR BLD: 0 % (ref 0.5–7.8)
EOSINOPHIL NFR BLD: 1 % (ref 0.5–7.8)
EPI CELLS #/AREA URNS HPF: ABNORMAL /HPF
ERYTHROCYTE [DISTWIDTH] IN BLOOD BY AUTOMATED COUNT: 12.8 % (ref 11.9–14.6)
ERYTHROCYTE [DISTWIDTH] IN BLOOD BY AUTOMATED COUNT: 12.9 % (ref 11.9–14.6)
GLOBULIN SER CALC-MCNC: 3.1 G/DL (ref 2.3–3.5)
GLUCOSE SERPL-MCNC: 151 MG/DL (ref 65–100)
GLUCOSE SERPL-MCNC: 172 MG/DL (ref 65–100)
GLUCOSE UR STRIP.AUTO-MCNC: NEGATIVE MG/DL
HCT VFR BLD AUTO: 39.9 % (ref 41.1–50.3)
HCT VFR BLD AUTO: 40 % (ref 41.1–50.3)
HGB BLD-MCNC: 13 G/DL (ref 13.6–17.2)
HGB BLD-MCNC: 13.1 G/DL (ref 13.6–17.2)
HGB UR QL STRIP: ABNORMAL
IMM GRANULOCYTES # BLD: 0 K/UL (ref 0–0.5)
IMM GRANULOCYTES # BLD: 0 K/UL (ref 0–0.5)
IMM GRANULOCYTES NFR BLD AUTO: 0 % (ref 0–5)
IMM GRANULOCYTES NFR BLD AUTO: 0 % (ref 0–5)
KETONES UR QL STRIP.AUTO: NEGATIVE MG/DL
LACTATE BLD-SCNC: 1.4 MMOL/L (ref 0.5–1.9)
LACTATE BLD-SCNC: 1.7 MMOL/L (ref 0.5–1.9)
LEUKOCYTE ESTERASE UR QL STRIP.AUTO: ABNORMAL
LYMPHOCYTES # BLD: 0.5 K/UL (ref 0.5–4.6)
LYMPHOCYTES # BLD: 0.7 K/UL (ref 0.5–4.6)
LYMPHOCYTES NFR BLD: 6 % (ref 13–44)
LYMPHOCYTES NFR BLD: 8 % (ref 13–44)
MCH RBC QN AUTO: 28.8 PG (ref 26.1–32.9)
MCH RBC QN AUTO: 29.1 PG (ref 26.1–32.9)
MCHC RBC AUTO-ENTMCNC: 32.6 G/DL (ref 31.4–35)
MCHC RBC AUTO-ENTMCNC: 32.8 G/DL (ref 31.4–35)
MCV RBC AUTO: 88.5 FL (ref 79.6–97.8)
MCV RBC AUTO: 88.9 FL (ref 79.6–97.8)
MONOCYTES # BLD: 0.3 K/UL (ref 0.1–1.3)
MONOCYTES # BLD: 0.5 K/UL (ref 0.1–1.3)
MONOCYTES NFR BLD: 3 % (ref 4–12)
MONOCYTES NFR BLD: 5 % (ref 4–12)
NEUTS SEG # BLD: 8.1 K/UL (ref 1.7–8.2)
NEUTS SEG # BLD: 8.8 K/UL (ref 1.7–8.2)
NEUTS SEG NFR BLD: 86 % (ref 43–78)
NEUTS SEG NFR BLD: 91 % (ref 43–78)
NITRITE UR QL STRIP.AUTO: NEGATIVE
P-R INTERVAL, ECG05: 124 MS
PH UR STRIP: 7 [PH] (ref 5–9)
PLATELET # BLD AUTO: 213 K/UL (ref 150–450)
PLATELET # BLD AUTO: 278 K/UL (ref 150–450)
PMV BLD AUTO: 10.5 FL (ref 10.8–14.1)
PMV BLD AUTO: 10.8 FL (ref 10.8–14.1)
POTASSIUM SERPL-SCNC: 3.6 MMOL/L (ref 3.5–5.1)
POTASSIUM SERPL-SCNC: 3.8 MMOL/L (ref 3.5–5.1)
PROCALCITONIN SERPL-MCNC: 1.6 NG/ML
PROCALCITONIN SERPL-MCNC: 2.3 NG/ML
PROT SERPL-MCNC: 6.6 G/DL (ref 6.3–8.2)
PROT UR STRIP-MCNC: NEGATIVE MG/DL
Q-T INTERVAL, ECG07: 318 MS
QRS DURATION, ECG06: 98 MS
QTC CALCULATION (BEZET), ECG08: 422 MS
RBC # BLD AUTO: 4.5 M/UL (ref 4.23–5.67)
RBC # BLD AUTO: 4.51 M/UL (ref 4.23–5.67)
RBC #/AREA URNS HPF: ABNORMAL /HPF
SODIUM SERPL-SCNC: 137 MMOL/L (ref 136–145)
SODIUM SERPL-SCNC: 141 MMOL/L (ref 136–145)
SP GR UR REFRACTOMETRY: 1.01 (ref 1–1.02)
UROBILINOGEN UR QL STRIP.AUTO: 0.2 EU/DL (ref 0.2–1)
VENTRICULAR RATE, ECG03: 106 BPM
WBC # BLD AUTO: 9.4 K/UL (ref 4.3–11.1)
WBC # BLD AUTO: 9.8 K/UL (ref 4.3–11.1)
WBC URNS QL MICRO: ABNORMAL /HPF

## 2018-06-15 PROCEDURE — 80053 COMPREHEN METABOLIC PANEL: CPT

## 2018-06-15 PROCEDURE — 96361 HYDRATE IV INFUSION ADD-ON: CPT

## 2018-06-15 PROCEDURE — 80048 BASIC METABOLIC PNL TOTAL CA: CPT | Performed by: EMERGENCY MEDICINE

## 2018-06-15 PROCEDURE — 87086 URINE CULTURE/COLONY COUNT: CPT | Performed by: EMERGENCY MEDICINE

## 2018-06-15 PROCEDURE — 87040 BLOOD CULTURE FOR BACTERIA: CPT

## 2018-06-15 PROCEDURE — 99284 EMERGENCY DEPT VISIT MOD MDM: CPT

## 2018-06-15 PROCEDURE — 83605 ASSAY OF LACTIC ACID: CPT

## 2018-06-15 PROCEDURE — 96365 THER/PROPH/DIAG IV INF INIT: CPT

## 2018-06-15 PROCEDURE — 74011000258 HC RX REV CODE- 258: Performed by: EMERGENCY MEDICINE

## 2018-06-15 PROCEDURE — 81001 URINALYSIS AUTO W/SCOPE: CPT | Performed by: EMERGENCY MEDICINE

## 2018-06-15 PROCEDURE — 74011250637 HC RX REV CODE- 250/637: Performed by: EMERGENCY MEDICINE

## 2018-06-15 PROCEDURE — 84145 PROCALCITONIN (PCT): CPT | Performed by: EMERGENCY MEDICINE

## 2018-06-15 PROCEDURE — 74011250636 HC RX REV CODE- 250/636: Performed by: EMERGENCY MEDICINE

## 2018-06-15 PROCEDURE — 96365 THER/PROPH/DIAG IV INF INIT: CPT | Performed by: EMERGENCY MEDICINE

## 2018-06-15 PROCEDURE — 84145 PROCALCITONIN (PCT): CPT

## 2018-06-15 PROCEDURE — 71045 X-RAY EXAM CHEST 1 VIEW: CPT

## 2018-06-15 PROCEDURE — 85025 COMPLETE CBC W/AUTO DIFF WBC: CPT

## 2018-06-15 PROCEDURE — 99284 EMERGENCY DEPT VISIT MOD MDM: CPT | Performed by: EMERGENCY MEDICINE

## 2018-06-15 PROCEDURE — 74011250636 HC RX REV CODE- 250/636

## 2018-06-15 PROCEDURE — 96361 HYDRATE IV INFUSION ADD-ON: CPT | Performed by: EMERGENCY MEDICINE

## 2018-06-15 PROCEDURE — 93005 ELECTROCARDIOGRAM TRACING: CPT

## 2018-06-15 RX ORDER — GENTAMICIN SULFATE 80 MG/100ML
80 INJECTION, SOLUTION INTRAVENOUS ONCE
Status: COMPLETED | OUTPATIENT
Start: 2018-06-15 | End: 2018-06-15

## 2018-06-15 RX ORDER — SODIUM CHLORIDE 9 MG/ML
1000 INJECTION, SOLUTION INTRAVENOUS CONTINUOUS
Status: DISCONTINUED | OUTPATIENT
Start: 2018-06-15 | End: 2018-06-15 | Stop reason: HOSPADM

## 2018-06-15 RX ORDER — IBUPROFEN 800 MG/1
800 TABLET ORAL
Status: COMPLETED | OUTPATIENT
Start: 2018-06-15 | End: 2018-06-15

## 2018-06-15 RX ORDER — MORPHINE SULFATE 2 MG/ML
2 INJECTION, SOLUTION INTRAMUSCULAR; INTRAVENOUS
Status: DISCONTINUED | OUTPATIENT
Start: 2018-06-15 | End: 2018-06-15

## 2018-06-15 RX ORDER — ONDANSETRON 2 MG/ML
4 INJECTION INTRAMUSCULAR; INTRAVENOUS
Status: DISCONTINUED | OUTPATIENT
Start: 2018-06-15 | End: 2018-06-15

## 2018-06-15 RX ORDER — SULFAMETHOXAZOLE AND TRIMETHOPRIM 800; 160 MG/1; MG/1
1 TABLET ORAL 2 TIMES DAILY
Qty: 14 TAB | Refills: 0 | Status: SHIPPED | OUTPATIENT
Start: 2018-06-15 | End: 2018-06-18

## 2018-06-15 RX ORDER — SODIUM CHLORIDE 0.9 % (FLUSH) 0.9 %
5-10 SYRINGE (ML) INJECTION AS NEEDED
Status: DISCONTINUED | OUTPATIENT
Start: 2018-06-15 | End: 2018-06-15 | Stop reason: HOSPADM

## 2018-06-15 RX ORDER — ONDANSETRON 2 MG/ML
4 INJECTION INTRAMUSCULAR; INTRAVENOUS
Status: ACTIVE | OUTPATIENT
Start: 2018-06-15 | End: 2018-06-16

## 2018-06-15 RX ADMIN — SODIUM CHLORIDE 1000 ML: 900 INJECTION, SOLUTION INTRAVENOUS at 20:14

## 2018-06-15 RX ADMIN — SODIUM CHLORIDE 1000 ML: 900 INJECTION, SOLUTION INTRAVENOUS at 02:40

## 2018-06-15 RX ADMIN — CEFTRIAXONE 1 G: 1 INJECTION, POWDER, FOR SOLUTION INTRAMUSCULAR; INTRAVENOUS at 22:26

## 2018-06-15 RX ADMIN — IBUPROFEN 800 MG: 800 TABLET ORAL at 20:14

## 2018-06-15 RX ADMIN — GENTAMICIN SULFATE 80 MG: 80 INJECTION, SOLUTION INTRAVENOUS at 02:40

## 2018-06-15 NOTE — LETTER
[address] [facility] EMERGENCY DEPT 
[facility] [address] [facility] [id number] [phone] Work/School Note Date: 6/15/2018 To Whom It May concern: 
 
Rodriguez Maria Isabel was seen and treated today in the emergency room by the following provider(s): 
Attending Provider: Tab Camacho MD.   
 
Timoteocarmen Mondragoncristina return to work on Monday 6/18/18 Sincerely, Scott Paetl RN

## 2018-06-15 NOTE — ED TRIAGE NOTES
Pt had a prostate biopsy on Wednesday. Was seen and treated in the ED  last night for the same c/o.   Fever and nausea have presisted

## 2018-06-15 NOTE — ED NOTES
I have reviewed discharge instructions with the patient. The patient verbalized understanding. Patient left ED via Discharge Method: ambulatory to Home with wife. Opportunity for questions and clarification provided. Patient given 1 scripts. To continue your aftercare when you leave the hospital, you may receive an automated call from our care team to check in on how you are doing. This is a free service and part of our promise to provide the best care and service to meet your aftercare needs.  If you have questions, or wish to unsubscribe from this service please call [phone]. Thank you for Choosing our [facility] Emergency Department.

## 2018-06-15 NOTE — DISCHARGE INSTRUCTIONS

## 2018-06-15 NOTE — ED PROVIDER NOTES
HPI Comments: 51-year-old male complaining of fever body aches. Patient is status post prostate biopsy site having a fever today. Patient is a 48 y.o. male presenting with bleeding. The history is provided by the patient. Post-Op Problem   This is a new problem. The current episode started 12 to 24 hours ago. The problem occurs constantly. The problem has not changed since onset. Pertinent negatives include no chest pain and no abdominal pain. Nothing aggravates the symptoms. He has tried acetaminophen (antibiotics) for the symptoms. Past Medical History:   Diagnosis Date    Arthritis     shoulders & back    GERD (gastroesophageal reflux disease)     takes prevacid OTC prn    H/O brain tumor     crainotomy in 2010    High cholesterol     controlled with medications    History of kidney stones 10 yrs ago    surgical intervention required    History of thyroid cancer     resulted in thyroidectomy    Hypertension     managed well with meds    Thromboembolus ([address] 7/10    left leg DVT 2 days after brain surgery    Thyroid disease     managed with Synthroid    Unspecified adverse effect of anesthesia 2 yrs ago    difficult to sedate with colonoscopy then had very long recovery. Pt states he requires a large amount of anesthesia and as a result, pt is very slow to wake up.     Unspecified sleep apnea     does not use CPAP       Past Surgical History:   Procedure Laterality Date    COLONOSCOPY  2 yrs ago    HX HERNIA REPAIR      umbilical hernia at birth   Axel Lopez HX REFRACTIVE SURGERY Bilateral 07/26/2010    HX THYROIDECTOMY  2/14/12    HX TONSIL AND ADENOIDECTOMY  9/21/11    HX UROLOGICAL      kidney stone extraction    HX VASECTOMY      NEUROLOGICAL PROCEDURE UNLISTED      meningioma tumor removed from brain 07/26/2010         Family History:   Problem Relation Age of Onset    Heart Disease Father     Heart Attack Father     Malignant Hyperthermia Neg Hx     Pseudocholinesterase Deficiency Neg Hx     Delayed Awakening Neg Hx     Post-op Nausea/Vomiting Neg Hx     Emergence Delirium Neg Hx     Post-op Cognitive Dysfunction Neg Hx     Other Neg Hx        Social History     Social History    Marital status:      Spouse name: N/A    Number of children: N/A    Years of education: N/A     Occupational History    Not on file. Social History Main Topics    Smoking status: Never Smoker    Smokeless tobacco: Never Used    Alcohol use Yes      Comment: on occasion    Drug use: No    Sexual activity: Not on file     Other Topics Concern    Not on file     Social History Narrative         ALLERGIES: Review of patient's allergies indicates no known allergies. Review of Systems   Constitutional: Negative. Negative for activity change. HENT: Negative. Eyes: Negative. Respiratory: Negative. Cardiovascular: Negative. Negative for chest pain. Gastrointestinal: Negative. Negative for abdominal pain. Genitourinary: Negative. Musculoskeletal: Negative. Skin: Negative. Neurological: Negative. Psychiatric/Behavioral: Negative. All other systems reviewed and are negative. Vitals:    06/15/18 0120   BP: 98/57   Pulse: (!) 120   Resp: 18   Temp: (!) 103 °F (39.4 °C)   SpO2: 95%   Weight: 92.5 kg (204 lb)   Height: 6' 1\" (1.854 m)            Physical Exam   Constitutional: He is oriented to person, place, and time. He appears well-developed and well-nourished. He appears listless. Non-toxic appearance. He has a sickly appearance. He appears ill. He appears distressed. HENT:   Head: Normocephalic and atraumatic. Right Ear: External ear normal.   Left Ear: External ear normal.   Nose: Nose normal.   Mouth/Throat: Oropharynx is clear and moist. No oropharyngeal exudate. Eyes: Conjunctivae and EOM are normal. Pupils are equal, round, and reactive to light. Right eye exhibits no discharge. Left eye exhibits no discharge. No scleral icterus.    Neck: Normal range of motion. Neck supple. No JVD present. No tracheal deviation present. Cardiovascular: Normal rate, regular rhythm and intact distal pulses. Pulmonary/Chest: Effort normal and breath sounds normal. No stridor. No respiratory distress. He has no wheezes. He exhibits no tenderness. Abdominal: Soft. Bowel sounds are normal. He exhibits no distension and no mass. There is no tenderness. Musculoskeletal: Normal range of motion. He exhibits no edema or tenderness. Neurological: He is oriented to person, place, and time. He appears listless. No cranial nerve deficit. Skin: Skin is warm and dry. No rash noted. He is not diaphoretic. No erythema. No pallor. Psychiatric: He has a normal mood and affect. His behavior is normal. Thought content normal.   Nursing note and vitals reviewed. MDM  Number of Diagnoses or Management Options  Postoperative fever:   Urinary tract infection without hematuria, site unspecified:   Diagnosis management comments: Dr. Tamera Jeffries called about the patient's condition and wanted gentamicin given an workup done. After fluids and antibiotics patient states he feels much better fevers down longer tachycardic and ready to go home. We'll place him on Bactrim as Dr. Jen Engel recommended and he will see him in the office as soon as possible.        Amount and/or Complexity of Data Reviewed  Clinical lab tests: ordered and reviewed  Tests in the radiology section of CPT®: ordered and reviewed  Tests in the medicine section of CPT®: ordered and reviewed          ED Course       Procedures

## 2018-06-15 NOTE — IP AVS SNAPSHOT
[address] 
[phone] Patient: Jessie Ponce. MRN: [id number] GTB:[id number] About your hospitalization You were admitted on:  June 16, 2018 You last received care in the:  [address] [place] [address] You were discharged on:  June 18, 2018 Why you were hospitalized Your primary diagnosis was:  Not on File Follow-up Information Follow up With Details Comments Contact Info Wilman Delacruz, [facility] 
[phone] Jodi Agosto DO  OFFICE WILL CALL YOU [facility] AN APPT [address] [facility] [address] 
[phone] Discharge Orders None A check dominic indicates which time of day the medication should be taken. My Medications CONTINUE taking these medications Instructions Each Dose to Equal  
 Morning Noon Evening Bedtime ANTARA 130 mg capsule Generic drug:  fenofibrate micronized Your next dose is:  TOMORROW Take 130 mg by mouth every morning. 130 mg  
    
   
   
   
  
 aspirin, buffered 81 mg Tab Your next dose is:  TOMORROW Take 81 mg by mouth daily. Pt has been holding 81 mg CLARITIN 10 mg tablet Generic drug:  loratadine Take 10 mg by mouth daily as needed for Allergies. 10 mg  
    
   
   
   
  
 clonazePAM 1 mg tablet Commonly known as:  Raydell Maria Your next dose is:  TODAY Take 1 mg by mouth nightly. 1 mg EXCEDRIN EXTRA STRENGTH 250-250-65 mg per tablet Generic drug:  aspirin-acetaminophen-caffeine Take 1 Tab by mouth every six (6) hours as needed for Pain. 1 Tab FIORICET -40 mg per tablet Generic drug:  butalbital-acetaminophen-caffeine Take 1 Tab by mouth as needed for Pain. 1 Tab FLONASE 50 mcg/actuation nasal spray Generic drug:  fluticasone 2 Sprays as needed. 2 Spray  
    
   
   
   
  
 ibuprofen 200 mg tablet Commonly known as:  MOTRIN Take  by mouth as needed for Pain.  
     
   
   
   
  
 losartan 50 mg tablet Commonly known as:  COZAAR Your next dose is:  TOMORROW Take 50 mg by mouth daily. 50 mg  
    
   
   
   
  
 metoprolol succinate 25 mg XL tablet Commonly known as:  TOPROL-XL Your next dose is:  TOMORROW Take 12.5 mg by mouth daily. 12.5 mg  
    
   
   
   
  
 montelukast 10 mg tablet Commonly known as:  SINGULAIR Your next dose is:  TOMORROW Take 10 mg by mouth daily. 10 mg  
    
   
   
   
  
 MULTIVITAMIN PO Your next dose is:  TOMORROW Take 1 Tab by mouth every morning. 1 Tab OTHER(NON-FORMULARY) Your next dose is:  TOMORROW  
   
 1 Cap every morning. RESERVOR - ina extract 1 Cap PROBIOTIC PO Take 1 Cap by mouth every other day. Takes 1 capsule three times a week. 1 Cap  
    
   
   
   
  
 saw palmetto 160 mg Cap Your next dose is:  TOMORROW Take  by mouth daily. SYNTHROID 150 mcg tablet Generic drug:  levothyroxine Your next dose is:  TOMORROW Take  by mouth Daily (before breakfast). trimethoprim-sulfamethoxazole 160-800 mg per tablet Commonly known as:  BACTRIM DS Your next dose is:  TODAY Take 1 Tab by mouth two (2) times a day. 1 Tab STOP taking these medications   
 ciprofloxacin HCl 500 mg tablet Commonly known as:  CIPRO Where to Get Your Medications Information on where to get these meds will be given to you by the nurse or doctor. ! Ask your nurse or doctor about these medications  
  trimethoprim-sulfamethoxazole 160-800 mg per tablet Discharge Instructions DISCHARGE SUMMARY from Nurse The following personal items are in your possession at time of discharge: 
 
Dental Appliances: None Visual Aid: None Home Medications: None Jewelry: None Clothing: At bedside Other Valuables: None PATIENT INSTRUCTIONS: 
 
After general anesthesia or intravenous sedation, for 24 hours or while taking prescription Narcotics: · Limit your activities · Do not drive and operate hazardous machinery · Do not make important personal or business decisions · Do  not drink alcoholic beverages · If you have not urinated within 8 hours after discharge, please contact your surgeon on call. Report the following to your surgeon: 
· Excessive pain, swelling, redness or odor of or around the surgical area · Temperature over 100.5 · Nausea and vomiting lasting longer than 4 hours or if unable to take medications · Any signs of decreased circulation or nerve impairment to extremity: change in color, persistent  numbness, tingling, coldness or increase pain · Any questions What to do at Home: 
Recommended activity: Activity as tolerated, per MD 
 
If you experience any of the following symptoms fever>101, pain unrelieved with medication, nausea/vomiting, shortness of breath, dizziness/fainting, chest pain. , please follow up with your doctor. *  Please give a list of your current medications to your Primary Care Provider. *  Please update this list whenever your medications are discontinued, doses are 
    changed, or new medications (including over-the-counter products) are added. *  Please carry medication information at all times in case of emergency situations. These are general instructions for a healthy lifestyle: No smoking/ No tobacco products/ Avoid exposure to second hand smoke Surgeon General's Warning:  Quitting smoking now greatly reduces serious risk to your health. Obesity, smoking, and sedentary lifestyle greatly increases your risk for illness A healthy diet, regular physical exercise & weight monitoring are important for maintaining a healthy lifestyle You may be retaining fluid if you have a history of heart failure or if you experience any of the following symptoms:  Weight gain of 3 pounds or more overnight or 5 pounds in a week, increased swelling in our hands or feet or shortness of breath while lying flat in bed. Please call your doctor as soon as you notice any of these symptoms; do not wait until your next office visit. Recognize signs and symptoms of STROKE: 
 
F-face looks uneven A-arms unable to move or move unevenly S-speech slurred or non-existent T-time-call 911 as soon as signs and symptoms begin-DO NOT go Back to bed or wait to see if you get better-TIME IS BRAIN. Warning Signs of HEART ATTACK Call 911 if you have these symptoms: 
? Chest discomfort. Most heart attacks involve discomfort in the center of the chest that lasts more than a few minutes, or that goes away and comes back. It can feel like uncomfortable pressure, squeezing, fullness, or pain. ? Discomfort in other areas of the upper body. Symptoms can include pain or discomfort in one or both arms, the back, neck, jaw, or stomach. ? Shortness of breath with or without chest discomfort. ? Other signs may include breaking out in a cold sweat, nausea, or lightheadedness. Don't wait more than five minutes to call [address]! Fast action can save your life. Calling 911 is almost always the fastest way to get lifesaving treatment. Emergency Medical Services staff can begin treatment when they arrive  up to an hour sooner than if someone gets to the hospital by car. The discharge information has been reviewed with the patient. The patient verbalized understanding. Discharge medications reviewed with the patient and appropriate educational materials and side effects teaching were provided. Urinary Tract Infections in Men: Care Instructions Your Care Instructions A urinary tract infection, or UTI, is a general term for an infection anywhere between the kidneys and the tip of the penis. UTIs can also be a result of a prostate problem. Most cause pain or burning when you urinate. Most UTIs are caused by bacteria and can be cured with antibiotics. It is important to complete your treatment so that the infection does not get worse. Follow-up care is a key part of your treatment and safety. Be sure to make and go to all appointments, and call your doctor if you are having problems. It's also a good idea to know your test results and keep a list of the medicines you take. How can you care for yourself at home? · Take your antibiotics as prescribed. Do not stop taking them just because you feel better. You need to take the full course of antibiotics. · Take your medicines exactly as prescribed. Your doctor may have prescribed a medicine, such as phenazopyridine (Pyridium), to help relieve pain when you urinate. This turns your urine orange. You may stop taking it when your symptoms get better. But be sure to take all of your antibiotics, which treat the infection. · Drink extra water for the next day or two. This will help make the urine less concentrated and help wash out the bacteria causing the infection. (If you have kidney, heart, or liver disease and have to limit your fluids, talk with your doctor before you increase your fluid intake.) · Avoid drinks that are carbonated or have caffeine. They can irritate the bladder. · Urinate often. Try to empty your bladder each time. · To relieve pain, take a hot bath or lay a heating pad (set on low) over your lower belly or genital area. Never go to sleep with a heating pad in place. To help prevent UTIs · Drink plenty of fluids, enough so that your urine is light yellow or clear like water.  If you have kidney, heart, or liver disease and have to limit fluids, talk with your doctor before you increase the amount of fluids you drink. · Urinate when you have the urge. Do not hold your urine for a long time. Urinate before you go to sleep. · Keep your penis clean. Catheter care If you have a drainage tube (catheter) in place, the following steps will help you care for it. · Always wash your hands before and after touching your catheter. · Check the area around the urethra for inflammation or signs of infection. Signs of infection include irritated, swollen, red, or tender skin, or pus around the catheter. · Clean the area around the catheter with soap and water two times a day. Dry with a clean towel afterward. · Do not apply powder or lotion to the skin around the catheter. To empty the urine collection bag · Wash your hands with soap and water. · Without touching the drain spout, remove the spout from its sleeve at the bottom of the collection bag. Open the valve on the spout. · Let the urine flow out of the bag and into the toilet or a container. Do not let the tubing or drain spout touch anything. · After you empty the bag, clean the end of the drain spout with tissue and water. Close the valve and put the drain spout back into its sleeve at the bottom of the collection bag. · Wash your hands with soap and water. When should you call for help? Call your doctor now or seek immediate medical care if: 
? · Symptoms such as a fever, chills, nausea, or vomiting get worse or happen for the first time. ? · You have new pain in your back just below your rib cage. This is called flank pain. ? · There is new blood or pus in your urine. ? · You are not able to take or keep down your antibiotics. ? Watch closely for changes in your health, and be sure to contact your doctor if: 
? · You are not getting better after taking an antibiotic for 2 days. ? · Your symptoms go away but then come back. Where can you learn more? Go to http://chetna-radha.info/. Enter D187 in the search box to learn more about \"Urinary Tract Infections in Men: Care Instructions. \" Current as of: May 12, 2017 Content Version: 11.4 © 4992-4192 [facility]. Care instructions adapted under license by [employer] (which disclaims liability or warranty for this information). If you have questions about a medical condition or this instruction, always ask your healthcare professional. [address] any warranty or liability for your use of this information. [employer] Announcement We are excited to announce that we are making your provider's discharge notes available to you in [employer]. You will see these notes when they are completed and signed by the physician that discharged you from your recent hospital stay. If you have any questions or concerns about any information you see in [employer], please call the Health Information Department where you were seen or reach out to your Primary Care Provider for more information about your plan of care. Introducing [facility] & HEALTH SERVICES! Dear Bernardino Hsu: Thank you for requesting a [employer] account. Our records indicate that you already have an active [employer] account. You can access your account anytime at [email or web address] Did you know that you can access your hospital and ER discharge instructions at any time in [employer]? You can also review all of your test results from your hospital stay or ER visit. Additional Information If you have questions, please visit the Frequently Asked Questions section of the [employer] website at [email or web address] Remember, [employer] is NOT to be used for urgent needs. For medical emergencies, dial 911. Now available from your iPhone and Android! Introducing Golden Patel As a [facility] patient, I wanted to make you aware of our electronic visit tool called Golden [employer]im[employer] Buddy Media. Gregoria Old 24/7 allows you to connect within minutes with a medical provider 24 hours a day, seven days a week via a mobile device or tablet or logging into a secure website from your computer. You can access Quintiq from anywhere in the [place]. A virtual visit might be right for you when you have a simple condition and feel like you just dont want to get out of bed, or cant get away from work for an appointment, when your regular spotflux provider is not available (evenings, weekends or holidays), or when youre out of town and need minor care. Electronic visits cost only $49 and if the spotflux 24/7 provider determines a prescription is needed to treat your condition, one can be electronically transmitted to a nearby pharmacy*. Please take a moment to enroll today if you have not already done so. The enrollment process is free and takes just a few minutes. To enroll, please download the Gregoria Old 24/7 felix to your tablet or phone, or visit [email or web address] to enroll on your computer. And, as an [address] [place] [address] patient with a [employer] account, the results of your visits will be scanned into your electronic medical record and your primary care provider will be able to view the scanned results. We urge you to continue to see your regular spotflux provider for your ongoing medical care. And while your primary care provider may not be the one available when you seek a Quintiq virtual visit, the peace of mind you get from getting a real diagnosis real time can be priceless. For more information on Quintiq, view our Frequently Asked Questions (FAQs) at [email or web address]. Sincerely, 
 
Rhina Conner MD 
Chief Medical Officer Giovani Taveras *:  certain medications cannot be prescribed via Quintiq Unresulted tests-please follow up with your PCP on these results Procedure/Test Authorizing Provider CBC WITH AUTOMATED DIFF Julie Blizzard,   
 CULTURE, URINE Julie Blizzard, DO  
 METABOLIC PANEL, BASIC Julie Blizzard, DO  
 PROCALCITONIN Julie Blizzard, DO  
 URINALYSIS W/ RFLX MICROSCOPIC Julie Blizzard,   
  
Providers Seen During Your Hospitalization Provider Specialty Primary office phone Julie Blizzard, DO Emergency Medicine [phone] Marquis Beckman DO Urology [phone] Your Primary Care Physician (PCP) Primary Care Physician Office Phone Office Fax [address], [place] [address] You are allergic to the following No active allergies Recent Documentation Height Weight BMI Smoking Status 1.854 m 92.5 kg 26.91 kg/m2 Never Smoker Emergency Contacts Name Discharge Info Relation Home Work Mobile [address] [facility] CAREGIVER [3] Spouse [3] [phone] Patient Belongings The following personal items are in your possession at time of discharge: 
  Dental Appliances: None  Visual Aid: None      Home Medications: None   Jewelry: None  Clothing: At bedside    Other Valuables: None Please provide this summary of care documentation to your next provider. Signatures-by signing, you are acknowledging that this After Visit Summary has been reviewed with you and you have received a copy. Patient Signature:  ____________________________________________________________ Date:  ____________________________________________________________  
  
[facility] Signature:  ____________________________________________________________ Date:  ____________________________________________________________

## 2018-06-16 PROBLEM — T81.40XA POST OP INFECTION: Status: ACTIVE | Noted: 2018-06-16

## 2018-06-16 PROCEDURE — 74011000258 HC RX REV CODE- 258: Performed by: UROLOGY

## 2018-06-16 PROCEDURE — 74011250637 HC RX REV CODE- 250/637: Performed by: FAMILY MEDICINE

## 2018-06-16 PROCEDURE — 77030020253 HC SOL INJ D545NS .05 DEX .45 SAL

## 2018-06-16 PROCEDURE — 74011250636 HC RX REV CODE- 250/636: Performed by: UROLOGY

## 2018-06-16 PROCEDURE — 65270000029 HC RM PRIVATE

## 2018-06-16 PROCEDURE — 74011250637 HC RX REV CODE- 250/637: Performed by: UROLOGY

## 2018-06-16 RX ORDER — MONTELUKAST SODIUM 10 MG/1
10 TABLET ORAL DAILY
Status: DISCONTINUED | OUTPATIENT
Start: 2018-06-16 | End: 2018-06-18 | Stop reason: HOSPADM

## 2018-06-16 RX ORDER — IBUPROFEN 800 MG/1
800 TABLET ORAL
Status: DISCONTINUED | OUTPATIENT
Start: 2018-06-16 | End: 2018-06-18 | Stop reason: HOSPADM

## 2018-06-16 RX ORDER — DIPHENHYDRAMINE HYDROCHLORIDE 50 MG/ML
12.5 INJECTION, SOLUTION INTRAMUSCULAR; INTRAVENOUS
Status: DISCONTINUED | OUTPATIENT
Start: 2018-06-16 | End: 2018-06-18 | Stop reason: HOSPADM

## 2018-06-16 RX ORDER — METOPROLOL SUCCINATE 25 MG/1
12.5 TABLET, EXTENDED RELEASE ORAL DAILY
Status: DISCONTINUED | OUTPATIENT
Start: 2018-06-16 | End: 2018-06-18 | Stop reason: HOSPADM

## 2018-06-16 RX ORDER — CEFTRIAXONE 1 G/1
1 INJECTION, POWDER, FOR SOLUTION INTRAMUSCULAR; INTRAVENOUS EVERY 12 HOURS
Status: DISCONTINUED | OUTPATIENT
Start: 2018-06-16 | End: 2018-06-16 | Stop reason: CLARIF

## 2018-06-16 RX ORDER — DEXTROSE MONOHYDRATE AND SODIUM CHLORIDE 5; .45 G/100ML; G/100ML
100 INJECTION, SOLUTION INTRAVENOUS CONTINUOUS
Status: DISCONTINUED | OUTPATIENT
Start: 2018-06-16 | End: 2018-06-18 | Stop reason: HOSPADM

## 2018-06-16 RX ORDER — DOCUSATE SODIUM 100 MG/1
100 CAPSULE, LIQUID FILLED ORAL 2 TIMES DAILY
Status: DISCONTINUED | OUTPATIENT
Start: 2018-06-16 | End: 2018-06-18 | Stop reason: HOSPADM

## 2018-06-16 RX ORDER — METOPROLOL SUCCINATE 25 MG/1
25 TABLET, EXTENDED RELEASE ORAL
Status: DISCONTINUED | OUTPATIENT
Start: 2018-06-16 | End: 2018-06-18 | Stop reason: HOSPADM

## 2018-06-16 RX ORDER — LOSARTAN POTASSIUM 25 MG/1
25 TABLET ORAL
Status: DISCONTINUED | OUTPATIENT
Start: 2018-06-16 | End: 2018-06-18 | Stop reason: HOSPADM

## 2018-06-16 RX ORDER — ZOLPIDEM TARTRATE 5 MG/1
5 TABLET ORAL
Status: DISCONTINUED | OUTPATIENT
Start: 2018-06-16 | End: 2018-06-18 | Stop reason: HOSPADM

## 2018-06-16 RX ORDER — LEVOTHYROXINE SODIUM 75 UG/1
150 TABLET ORAL
Status: DISCONTINUED | OUTPATIENT
Start: 2018-06-16 | End: 2018-06-18 | Stop reason: HOSPADM

## 2018-06-16 RX ORDER — ACETAMINOPHEN 325 MG/1
650 TABLET ORAL
Status: DISCONTINUED | OUTPATIENT
Start: 2018-06-16 | End: 2018-06-18 | Stop reason: HOSPADM

## 2018-06-16 RX ORDER — BUTALBITAL, ACETAMINOPHEN AND CAFFEINE 50; 325; 40 MG/1; MG/1; MG/1
1 TABLET ORAL
Status: DISCONTINUED | OUTPATIENT
Start: 2018-06-16 | End: 2018-06-18 | Stop reason: HOSPADM

## 2018-06-16 RX ORDER — LORATADINE 10 MG/1
10 TABLET ORAL
Status: DISCONTINUED | OUTPATIENT
Start: 2018-06-16 | End: 2018-06-18 | Stop reason: HOSPADM

## 2018-06-16 RX ORDER — GUAIFENESIN 100 MG/5ML
81 LIQUID (ML) ORAL
Status: DISCONTINUED | OUTPATIENT
Start: 2018-06-16 | End: 2018-06-18 | Stop reason: HOSPADM

## 2018-06-16 RX ORDER — LOSARTAN POTASSIUM 50 MG/1
50 TABLET ORAL DAILY
Status: DISCONTINUED | OUTPATIENT
Start: 2018-06-16 | End: 2018-06-18 | Stop reason: HOSPADM

## 2018-06-16 RX ORDER — FLUTICASONE PROPIONATE 50 MCG
2 SPRAY, SUSPENSION (ML) NASAL AS NEEDED
Status: DISCONTINUED | OUTPATIENT
Start: 2018-06-16 | End: 2018-06-18 | Stop reason: HOSPADM

## 2018-06-16 RX ORDER — ONDANSETRON 2 MG/ML
4 INJECTION INTRAMUSCULAR; INTRAVENOUS
Status: DISCONTINUED | OUTPATIENT
Start: 2018-06-16 | End: 2018-06-18 | Stop reason: HOSPADM

## 2018-06-16 RX ORDER — FENOFIBRATE 160 MG/1
160 TABLET ORAL DAILY
Status: DISCONTINUED | OUTPATIENT
Start: 2018-06-16 | End: 2018-06-18 | Stop reason: HOSPADM

## 2018-06-16 RX ORDER — CLONAZEPAM 1 MG/1
1 TABLET ORAL
Status: DISCONTINUED | OUTPATIENT
Start: 2018-06-16 | End: 2018-06-18 | Stop reason: HOSPADM

## 2018-06-16 RX ORDER — HYDROCODONE BITARTRATE AND ACETAMINOPHEN 5; 325 MG/1; MG/1
1 TABLET ORAL
Status: DISCONTINUED | OUTPATIENT
Start: 2018-06-16 | End: 2018-06-18 | Stop reason: HOSPADM

## 2018-06-16 RX ORDER — CEFTRIAXONE 1 G/1
1 INJECTION, POWDER, FOR SOLUTION INTRAMUSCULAR; INTRAVENOUS EVERY 12 HOURS
Status: DISCONTINUED | OUTPATIENT
Start: 2018-06-16 | End: 2018-06-16 | Stop reason: SDUPTHER

## 2018-06-16 RX ADMIN — ACETAMINOPHEN 650 MG: 325 TABLET ORAL at 12:23

## 2018-06-16 RX ADMIN — DOCUSATE SODIUM 100 MG: 100 CAPSULE, LIQUID FILLED ORAL at 17:38

## 2018-06-16 RX ADMIN — FENOFIBRATE 160 MG: 160 TABLET ORAL at 08:30

## 2018-06-16 RX ADMIN — LEVOTHYROXINE SODIUM 150 MCG: 75 TABLET ORAL at 08:29

## 2018-06-16 RX ADMIN — LOSARTAN POTASSIUM 25 MG: 25 TABLET ORAL at 22:41

## 2018-06-16 RX ADMIN — CEFTRIAXONE 1 G: 1 INJECTION, POWDER, FOR SOLUTION INTRAMUSCULAR; INTRAVENOUS at 10:14

## 2018-06-16 RX ADMIN — DOCUSATE SODIUM 100 MG: 100 CAPSULE, LIQUID FILLED ORAL at 08:30

## 2018-06-16 RX ADMIN — DEXTROSE MONOHYDRATE AND SODIUM CHLORIDE 150 ML/HR: 5; .45 INJECTION, SOLUTION INTRAVENOUS at 22:41

## 2018-06-16 RX ADMIN — ASPIRIN 81 MG: 81 TABLET, CHEWABLE ORAL at 22:40

## 2018-06-16 RX ADMIN — MONTELUKAST SODIUM 10 MG: 10 TABLET, FILM COATED ORAL at 08:30

## 2018-06-16 RX ADMIN — IBUPROFEN 800 MG: 800 TABLET ORAL at 22:49

## 2018-06-16 RX ADMIN — DEXTROSE MONOHYDRATE AND SODIUM CHLORIDE 150 ML/HR: 5; .45 INJECTION, SOLUTION INTRAVENOUS at 02:13

## 2018-06-16 RX ADMIN — IBUPROFEN 800 MG: 800 TABLET ORAL at 10:18

## 2018-06-16 RX ADMIN — IBUPROFEN 800 MG: 800 TABLET ORAL at 17:39

## 2018-06-16 RX ADMIN — CEFTRIAXONE 1 G: 1 INJECTION, POWDER, FOR SOLUTION INTRAMUSCULAR; INTRAVENOUS at 22:41

## 2018-06-16 RX ADMIN — METOPROLOL SUCCINATE 12.5 MG: 25 TABLET, EXTENDED RELEASE ORAL at 08:29

## 2018-06-16 RX ADMIN — LOSARTAN POTASSIUM 50 MG: 50 TABLET ORAL at 08:30

## 2018-06-16 RX ADMIN — CLONAZEPAM 1 MG: 1 TABLET ORAL at 22:40

## 2018-06-16 RX ADMIN — IBUPROFEN 800 MG: 800 TABLET ORAL at 02:13

## 2018-06-16 RX ADMIN — METOPROLOL SUCCINATE 25 MG: 25 TABLET, EXTENDED RELEASE ORAL at 22:41

## 2018-06-16 RX ADMIN — CLONAZEPAM 1 MG: 1 TABLET ORAL at 02:14

## 2018-06-16 NOTE — ED PROVIDER NOTES
HPI Comments: Pt returned to ER due to persistent fever, chills and rigors. He also reports urinary frequency. He had a recent prostate Bx and was seen yesterday for same. Discharged home after discussion with Dr. Balbir Poon and normal blood work and a dose of [employer]. Patient is a 48 y.o. male presenting with fever. The history is provided by the patient. Fever    This is a recurrent problem. The current episode started 12 to 24 hours ago. The problem occurs constantly. The problem has not changed since onset. The maximum temperature noted was 102 - 102.9 F. The temperature was taken using an oral thermometer. Associated symptoms include headaches, muscle aches and urinary symptoms. Pertinent negatives include no chest pain, no sleepiness, no diarrhea, no vomiting, no congestion, no sore throat, no cough, no shortness of breath, no mental status change, no neck pain and no rash. He has tried acetaminophen for the symptoms. The treatment provided moderate relief. Past Medical History:   Diagnosis Date    Arthritis     shoulders & back    GERD (gastroesophageal reflux disease)     takes prevacid OTC prn    H/O brain tumor     crainotomy in 2010    High cholesterol     controlled with medications    History of kidney stones 10 yrs ago    surgical intervention required    History of thyroid cancer     resulted in thyroidectomy    Hypertension     managed well with meds    Thromboembolus ([address] 7/10    left leg DVT 2 days after brain surgery    Thyroid disease     managed with Synthroid    Unspecified adverse effect of anesthesia 2 yrs ago    difficult to sedate with colonoscopy then had very long recovery. Pt states he requires a large amount of anesthesia and as a result, pt is very slow to wake up.     Unspecified sleep apnea     does not use CPAP       Past Surgical History:   Procedure Laterality Date    COLONOSCOPY  2 yrs ago    HX HERNIA REPAIR      umbilical hernia at birth   [facility] HX [de-identified] SURGERY Bilateral 07/26/2010    HX THYROIDECTOMY  2/14/12    HX TONSIL AND ADENOIDECTOMY  9/21/11    HX UROLOGICAL      kidney stone extraction    HX VASECTOMY      NEUROLOGICAL PROCEDURE UNLISTED      meningioma tumor removed from brain 07/26/2010         Family History:   Problem Relation Age of Onset    Heart Disease Father     Heart Attack Father     Malignant Hyperthermia Neg Hx     Pseudocholinesterase Deficiency Neg Hx     Delayed Awakening Neg Hx     Post-op Nausea/Vomiting Neg Hx     Emergence Delirium Neg Hx     Post-op Cognitive Dysfunction Neg Hx     Other Neg Hx        Social History     Social History    Marital status:      Spouse name: N/A    Number of children: N/A    Years of education: N/A     Occupational History    Not on file. Social History Main Topics    Smoking status: Never Smoker    Smokeless tobacco: Never Used    Alcohol use Yes      Comment: on occasion    Drug use: No    Sexual activity: Not on file     Other Topics Concern    Not on file     Social History Narrative         ALLERGIES: Review of patient's allergies indicates no known allergies. Review of Systems   Constitutional: Positive for fever. HENT: Negative for congestion and sore throat. Respiratory: Negative for cough and shortness of breath. Cardiovascular: Negative for chest pain. Gastrointestinal: Negative for diarrhea and vomiting. Genitourinary: Positive for frequency. Negative for decreased urine volume, discharge, dysuria, flank pain, hematuria, penile pain, penile swelling, scrotal swelling, testicular pain and urgency. Musculoskeletal: Negative for neck pain. Skin: Negative for rash. Neurological: Positive for headaches. All other systems reviewed and are negative.       Vitals:    06/16/18 0143 06/16/18 0553 06/16/18 0817 06/16/18 1215   BP: 102/64 (!) 89/54 125/73 131/71   Pulse: 75 84 76 94   Resp: 16 16 16 16   Temp: 97.7 °F (36.5 °C) 98.1 °F (36.7 °C) 98.7 °F (37.1 °C) 99.6 °F (37.6 °C)   SpO2: 98% 96% 99% 99%   Weight:       Height:                Physical Exam   Constitutional: He is oriented to person, place, and time. He appears well-developed and well-nourished. No distress. HENT:   Head: Normocephalic and atraumatic. Eyes: Conjunctivae and EOM are normal. Pupils are equal, round, and reactive to light. Neck: Normal range of motion. Neck supple. Cardiovascular: Normal rate, regular rhythm and normal heart sounds. Pulmonary/Chest: Effort normal and breath sounds normal.   Abdominal: Soft. Bowel sounds are normal. He exhibits no distension. Musculoskeletal: Normal range of motion. Neurological: He is alert and oriented to person, place, and time. Skin: Skin is warm and dry. Psychiatric: He has a normal mood and affect. His behavior is normal.   Nursing note and vitals reviewed.        MDM  Number of Diagnoses or Management Options  Urinary tract infection without hematuria, site unspecified:   Diagnosis management comments: DDx:  UTI, Bacteremia,       Amount and/or Complexity of Data Reviewed  Clinical lab tests: ordered and reviewed  Tests in the radiology section of CPT®: ordered and reviewed  Independent visualization of images, tracings, or specimens: yes    Risk of Complications, Morbidity, and/or Mortality  Presenting problems: moderate  Diagnostic procedures: moderate  Management options: moderate    Patient Progress  Patient progress: stable        ED Course       Procedures

## 2018-06-16 NOTE — PROGRESS NOTES
TRANSFER - IN REPORT:    Verbal report received from ErinRN(name) on Amari Arthur.  being received from ED(unit) for routine progression of care      Report consisted of patients Situation, Background, Assessment and   Recommendations(SBAR). Information from the following report(s) SBAR, ED Summary, [facility] and Recent Results was reviewed with the receiving nurse. Opportunity for questions and clarification was provided. Assessment completed upon patients arrival to unit and care assumed.

## 2018-06-16 NOTE — ED NOTES
TRANSFER - OUT REPORT:    Verbal report given to [facility] on Sarah Potts.  being transferred to [facility] for routine progression of care       Report consisted of patients Situation, Background, Assessment and   Recommendations(SBAR). Information from the following report(s) SBAR, ED Summary, Intake/Output, MAR and Recent Results was reviewed with the receiving nurse. Lines:   Peripheral IV 06/15/18 Right Antecubital (Active)   Site Assessment Clean, dry, & intact 6/15/2018  8:00 PM   Phlebitis Assessment 0 6/15/2018  8:00 PM   Infiltration Assessment 0 6/15/2018  8:00 PM        Opportunity for questions and clarification was provided.       Patient transported with:   Registered Nurse

## 2018-06-16 NOTE — PROGRESS NOTES
Admission assessment complete. A/O x4. Respirations present and non-labored. Lung sounds clear bilaterally. No signs of distress noted. HR regular, S1&2 auscultated. Abdomen soft, active bowel sounds. No weakness noted. IVF infusing w/o difficulty. SCD's in place bilaterally. Denies need or pain at this time. Bed low and locked, call light in reach. Will continue to monitor hourly.

## 2018-06-16 NOTE — PROGRESS NOTES
Primary Nurse Susu Tinsley, LEON and Karyna Dewey RN performed a dual skin assessment on this patient,   Has bruising to left forearm, multiple tattoos to both upper arms. Williams score is 21.

## 2018-06-16 NOTE — PROGRESS NOTES
Pt assessment completed. Alert and oriented. Lungs CTA even and unlabored. Heart sounds regular. Abdomen soft and non tender with active bowel sounds. IV present and infusing without difficulty. Pt denies pain needs at this time. All needs met, will continue to monitor.

## 2018-06-16 NOTE — H&P
[facility]  HISTORY AND PHYSICAL      Treva Nguyen  MR#: [id number]  : 1965  ACCOUNT #: [de-identified]   ADMIT DATE: 06/15/2018    HISTORY OF PRESENT ILLNESS:  This is a 44-year-old gentleman who is status post MRI fusion biopsies on 2018 by Dr. Hanna Kruger. Approximately 36 hours following the procedure, the patient began developing low-grade fevers and chills. This progressed to fevers up to 103 Fahrenheit with rigors. He was seen in the Emergency Room yesterday morning and given a dose of IV gentamicin. His symptoms persisted and he was seen back in the Emergency Room last evening. He was given a dose of Rocephin and a decision was made to admit the patient. He denies any significant voiding symptoms. He denies any prior urological procedures other than his recent biopsy. PAST MEDICAL HISTORY:  Osteoarthritis, gastroesophageal reflux disease, history of brain tumor, hypercholesterolemia, history of kidney stones, history of thyroid cancer, hypertension, history of DVT, hypothyroidism. PAST SURGICAL HISTORY:  As above, colonoscopy, umbilical hernia repair. ophthalmologic surgery, thyroidectomy, tonsillectomy and adenoidectomy, kidney stone extraction, vasectomy, meningioma removal.    FAMILY HISTORY:  Significant for heart disease. SOCIAL HISTORY:  . No recent alcohol or tobacco use. ALLERGIES:  NO KNOWN DRUG ALLERGIES. REVIEW OF SYSTEMS:  As per HPI. All other systems are reviewed and are negative at this time. PHYSICAL EXAMINATION:  VITAL SIGNS:  Temperature upon arrival to the ER last night is 100.3, temperature currently is 98.1, pulse 84, blood pressure 102/64, respirations 16. GENERAL:  He is alert and in no obvious distress. CHEST:  Clear to auscultation. CARDIOVASCULAR:  Regular rate and rhythm. ABDOMEN:  Soft, nontender, nondistended. EXTREMITIES:  No clubbing, cyanosis or edema.   NEUROLOGIC: Nonfocal.    PERTINENT LABORATORIES:  White count is 9.8, hemoglobin 13.0. Urinalysis shows 20-50 white cells, 3-5 red cells, no bacteria. Creatinine is 1.34. Blood and urine cultures are pending. ASSESSMENT:  A 59-year-old gentleman with post-prostate biopsy infection. The patient will be admitted for IV fluid resuscitation and started on Rocephin until his cultures are available.       DO SINGH Melo/  D: 06/16/2018 08:29     T: 06/16/2018 08:54  JOB #: [id number]

## 2018-06-16 NOTE — PROGRESS NOTES
Pt requested motrin \" to keep his fever away\" administered motrin 800 mg PO per MD order, will continue to monitor.

## 2018-06-17 PROCEDURE — 74011250636 HC RX REV CODE- 250/636: Performed by: UROLOGY

## 2018-06-17 PROCEDURE — 77030020253 HC SOL INJ D545NS .05 DEX .45 SAL

## 2018-06-17 PROCEDURE — 74011000258 HC RX REV CODE- 258: Performed by: UROLOGY

## 2018-06-17 PROCEDURE — 74011250637 HC RX REV CODE- 250/637: Performed by: UROLOGY

## 2018-06-17 PROCEDURE — 65270000029 HC RM PRIVATE

## 2018-06-17 PROCEDURE — 74011250637 HC RX REV CODE- 250/637: Performed by: FAMILY MEDICINE

## 2018-06-17 RX ADMIN — LEVOTHYROXINE SODIUM 150 MCG: 75 TABLET ORAL at 08:20

## 2018-06-17 RX ADMIN — DEXTROSE MONOHYDRATE AND SODIUM CHLORIDE 100 ML/HR: 5; .45 INJECTION, SOLUTION INTRAVENOUS at 14:22

## 2018-06-17 RX ADMIN — METOPROLOL SUCCINATE 12.5 MG: 25 TABLET, EXTENDED RELEASE ORAL at 08:20

## 2018-06-17 RX ADMIN — LOSARTAN POTASSIUM 25 MG: 25 TABLET ORAL at 22:08

## 2018-06-17 RX ADMIN — CEFTRIAXONE 1 G: 1 INJECTION, POWDER, FOR SOLUTION INTRAMUSCULAR; INTRAVENOUS at 22:09

## 2018-06-17 RX ADMIN — FENOFIBRATE 160 MG: 160 TABLET ORAL at 08:20

## 2018-06-17 RX ADMIN — ACETAMINOPHEN 650 MG: 325 TABLET ORAL at 00:30

## 2018-06-17 RX ADMIN — ASPIRIN 81 MG: 81 TABLET, CHEWABLE ORAL at 22:08

## 2018-06-17 RX ADMIN — DEXTROSE MONOHYDRATE AND SODIUM CHLORIDE 150 ML/HR: 5; .45 INJECTION, SOLUTION INTRAVENOUS at 04:33

## 2018-06-17 RX ADMIN — DOCUSATE SODIUM 100 MG: 100 CAPSULE, LIQUID FILLED ORAL at 08:20

## 2018-06-17 RX ADMIN — MONTELUKAST SODIUM 10 MG: 10 TABLET, FILM COATED ORAL at 08:20

## 2018-06-17 RX ADMIN — METOPROLOL SUCCINATE 25 MG: 25 TABLET, EXTENDED RELEASE ORAL at 22:08

## 2018-06-17 RX ADMIN — IBUPROFEN 800 MG: 800 TABLET ORAL at 20:34

## 2018-06-17 RX ADMIN — CEFTRIAXONE 1 G: 1 INJECTION, POWDER, FOR SOLUTION INTRAMUSCULAR; INTRAVENOUS at 09:56

## 2018-06-17 RX ADMIN — CLONAZEPAM 1 MG: 1 TABLET ORAL at 22:08

## 2018-06-17 RX ADMIN — DOCUSATE SODIUM 100 MG: 100 CAPSULE, LIQUID FILLED ORAL at 16:59

## 2018-06-17 RX ADMIN — IBUPROFEN 800 MG: 800 TABLET ORAL at 06:33

## 2018-06-17 RX ADMIN — LOSARTAN POTASSIUM 50 MG: 50 TABLET ORAL at 08:20

## 2018-06-17 NOTE — PROGRESS NOTES
Initial visit to assess pt's spiritual needs. Ministry of presence & prayer to demonstrate caring & concern, convey emotional & spiritual support.      James Jiménez, MDiv,ThM,PhD

## 2018-06-17 NOTE — PROGRESS NOTES
Patient request BP medications per home regimen. Spoke to Dr. Jasmyn Lisa, orders received, see MAR.

## 2018-06-17 NOTE — PROGRESS NOTES
Assessment completed and documented. Lung sounds clear. Heart sounds regular. Bowel sounds active. Pain mild. Currently resting in bed. Will continue to monitor with hourly rounding.

## 2018-06-17 NOTE — PROGRESS NOTES
Shift assessment complete. Pt. is A&O x 4. Denies pain. No acute distress noted. Respirations even and unlabored. Abdomen soft, non tender. Bowel sounds active in all four quadrants. IV intact and infusing without difficulty. Call light in reach.

## 2018-06-17 NOTE — PROGRESS NOTES
Doing better but reports chills/diaphoresis late last night. Occ leg cramp. Tm 100.9. Tc 98  VSS  Abd soft, NT, ND  ALLIE- anodular. No palpable fluctuant masses  Ext- neg Homans. No swelling or tenderness  All cx NGTD  Fevers s/p MRI fusion biopsies. Cont Rocephin. Likely home on two wk course of Bactrim once afebrile. Ambulate.

## 2018-06-18 VITALS
TEMPERATURE: 98.3 F | SYSTOLIC BLOOD PRESSURE: 129 MMHG | DIASTOLIC BLOOD PRESSURE: 57 MMHG | HEIGHT: 73 IN | BODY MASS INDEX: 27.04 KG/M2 | RESPIRATION RATE: 18 BRPM | HEART RATE: 90 BPM | OXYGEN SATURATION: 98 % | WEIGHT: 204 LBS

## 2018-06-18 LAB
BACTERIA SPEC CULT: NORMAL
SERVICE CMNT-IMP: NORMAL

## 2018-06-18 PROCEDURE — 74011250637 HC RX REV CODE- 250/637: Performed by: UROLOGY

## 2018-06-18 PROCEDURE — 74011000258 HC RX REV CODE- 258: Performed by: UROLOGY

## 2018-06-18 PROCEDURE — 74011250636 HC RX REV CODE- 250/636: Performed by: UROLOGY

## 2018-06-18 RX ORDER — SULFAMETHOXAZOLE AND TRIMETHOPRIM 800; 160 MG/1; MG/1
1 TABLET ORAL 2 TIMES DAILY
Qty: 28 TAB | Refills: 0 | Status: SHIPPED | OUTPATIENT
Start: 2018-06-18 | End: 2019-06-04

## 2018-06-18 RX ADMIN — METOPROLOL SUCCINATE 12.5 MG: 25 TABLET, EXTENDED RELEASE ORAL at 08:12

## 2018-06-18 RX ADMIN — MONTELUKAST SODIUM 10 MG: 10 TABLET, FILM COATED ORAL at 08:12

## 2018-06-18 RX ADMIN — DOCUSATE SODIUM 100 MG: 100 CAPSULE, LIQUID FILLED ORAL at 08:12

## 2018-06-18 RX ADMIN — CEFTRIAXONE 1 G: 1 INJECTION, POWDER, FOR SOLUTION INTRAMUSCULAR; INTRAVENOUS at 10:02

## 2018-06-18 RX ADMIN — FENOFIBRATE 160 MG: 160 TABLET ORAL at 08:12

## 2018-06-18 RX ADMIN — LOSARTAN POTASSIUM 50 MG: 50 TABLET ORAL at 08:13

## 2018-06-18 RX ADMIN — LEVOTHYROXINE SODIUM 150 MCG: 75 TABLET ORAL at 08:12

## 2018-06-18 NOTE — PROGRESS NOTES
AM Assessment. Pt. A/O X4. Respirations even and unlabored. S1 & S2 auscultated. Lungs clear. Abd. Soft and non tender. Bowel Sounds active X4 quads. IV patent and infusing. Pt. Voiding without difficulty. Denies any weakness. Call light within reach. Will monitor hourly.

## 2018-06-18 NOTE — PROGRESS NOTES
Admit Date: 6/15/2018    Subjective:     Mr. Gary Azar is sitting up in bed and reports he is feeling much better. He has been afebrile >24 hours. He is voiding well. Objective:     Patient Vitals for the past 8 hrs:   BP Temp Pulse Resp SpO2   06/18/18 0812 129/57 - - - -   06/18/18 0440 139/89 97.3 °F (36.3 °C) 73 17 97 %        06/16 1901 - 06/18 0700  In: 9443 [I.V.:3919]  Out: 3000 [Urine:3000]    Physical Exam:  GENERAL: alert, cooperative, no distress  LUNG: clear to auscultation bilaterally  HEART: regular rate and rhythm, S1, S2   ABDOMEN: soft, non-tender. Active BS  NEUROLOGIC: AOx3    Data Review No results found for this or any previous visit (from the past 24 hour(s)). Assessment:     Active Problems:    Post op infection (6/16/2018)      BC x 2 - NGTD  Urine culture- negative. VSS. Afebrile >24 hours    Plan:     Discharge home today with PO bactrim x 2 weeks. Follow-up with Dr. Jeff Cunningham in 2 weeks, office will call with appointment date/time.     Shannon Stratton NP

## 2018-06-18 NOTE — DISCHARGE SUMMARY
Discharge Summary     Patient: Petra Hammonds. MRN: [id number]  SSN: [id number]    YOB: 1965  Age: 48 y.o. Sex: male      Allergies: Review of patient's allergies indicates no known allergies. Admit Date: 6/15/2018    Discharge Date: 6/18/2018     * Admission Diagnoses:  Post op infection     * Discharge Diagnoses:   Hospital Problems as of 6/18/2018  Date Reviewed: 6/5/2018          Codes Class Noted - Resolved POA    Post op infection ICD-10-CM: T81. 4XXA  ICD-9-CM: 998.59  6/16/2018 - Present Unknown               * Procedures for this admission:   * No surgery found *      * Disposition: Home    * Discharged Condition: improved    [facility] Course:      Mr. Dorian Dixon underwent a MRI fusion biopsy on 6/13/18 by Dr. Thierno Alfaro. Per H&P:  \"This is a 57-year-old gentleman who is status post MRI fusion biopsies on 06/13/2018 by Dr. Thierno Alfaro. Approximately 36 hours following the procedure, the patient began developing low-grade fevers and chills. This progressed to fevers up to 103 Fahrenheit with rigors. He was seen in the Emergency Room yesterday morning and given a dose of IV gentamicin. His symptoms persisted and he was seen back in the Emergency Room last evening. He was given a dose of Rocephin and a decision was made to admit the patient. He denies any significant voiding symptoms. He denies any prior urological procedures other than his recent biopsy. \"    He has remained afebrile for >24 hours. BC x 2 and Urine cultures were all negative. He is feeling much better today. He will go home with a two week supply of PO bactrim and will RTO in 2 weeks to see Dr. Thierno Alfaro. Patient Active Problem List   Diagnosis Code    Sleep apnea G47.30    Post op infection T81. 4XXA            Discharge Medications:   Current Discharge Medication List      CONTINUE these medications which have CHANGED    Details   trimethoprim-sulfamethoxazole (BACTRIM DS) 160-800 mg per tablet Take 1 Tab by mouth two (2) times a day. Qty: 28 Tab, Refills: 0         CONTINUE these medications which have NOT CHANGED    Details   clonazePAM (KLONOPIN) 1 mg tablet Take 1 mg by mouth nightly. saw palmetto 160 mg cap Take  by mouth daily. levothyroxine (SYNTHROID) 150 mcg tablet Take  by mouth Daily (before breakfast). losartan (COZAAR) 50 mg tablet Take 50 mg by mouth daily. metoprolol succinate (TOPROL-XL) 25 mg XL tablet Take 12.5 mg by mouth daily. Aspirin, Buffered 81 mg tab Take 81 mg by mouth daily. Pt has been holding      loratadine (CLARITIN) 10 mg tablet Take 10 mg by mouth daily as needed for Allergies. fluticasone (FLONASE) 50 mcg/actuation nasal spray 2 Sprays as needed. butalbital-acetaminophen-caffeine (FIORICET) -40 mg per tablet Take 1 Tab by mouth as needed for Pain. aspirin-acetaminophen-caffeine (EXCEDRIN EXTRA STRENGTH) 250-250-65 mg per tablet Take 1 Tab by mouth every six (6) hours as needed for Pain. ibuprofen (MOTRIN) 200 mg tablet Take  by mouth as needed for Pain.      montelukast (SINGULAIR) 10 mg tablet Take 10 mg by mouth daily. LACTOBACILLUS RHAMNOSUS GG (PROBIOTIC PO) Take 1 Cap by mouth every other day. Takes 1 capsule three times a week. OTHER,NON-FORMULARY, 1 Cap every morning. RESERVOR - ina extract       fenofibrate micronized (ANTARA) 130 mg capsule Take 130 mg by mouth every morning. MULTIVITAMINS (MULTIVITAMIN PO) Take 1 Tab by mouth every morning. STOP taking these medications       ciprofloxacin HCl (CIPRO) 500 mg tablet Comments:   Reason for Stopping:                * Follow-up Care/Patient Instructions:   Activity: Activity as tolerated  Diet: Regular Diet, stay hydrated   Wound Care: None needed    Follow-up Information     Follow up With Details Comments [address] [facility]   [address] [address] ,   OFFICE WILL CALL Ryan Robson [facility] AN APPT [address]   [phone]             Signed By: Akshat Houston, BLAS     June 18, 2018

## 2018-06-18 NOTE — DISCHARGE INSTRUCTIONS
DISCHARGE SUMMARY from Nurse    The following personal items are in your possession at time of discharge:    Dental Appliances: None  Visual Aid: None     Home Medications: None  Jewelry: None  Clothing: At bedside  Other Valuables: None             PATIENT INSTRUCTIONS:    After general anesthesia or intravenous sedation, for 24 hours or while taking prescription Narcotics:  · Limit your activities  · Do not drive and operate hazardous machinery  · Do not make important personal or business decisions  · Do  not drink alcoholic beverages  · If you have not urinated within 8 hours after discharge, please contact your surgeon on call. Report the following to your surgeon:  · Excessive pain, swelling, redness or odor of or around the surgical area  · Temperature over 100.5  · Nausea and vomiting lasting longer than 4 hours or if unable to take medications  · Any signs of decreased circulation or nerve impairment to extremity: change in color, persistent  numbness, tingling, coldness or increase pain  · Any questions        What to do at Home:  Recommended activity: Activity as tolerated, per MD    If you experience any of the following symptoms fever>101, pain unrelieved with medication, nausea/vomiting, shortness of breath, dizziness/fainting, chest pain. , please follow up with your doctor. *  Please give a list of your current medications to your Primary Care Provider. *  Please update this list whenever your medications are discontinued, doses are      changed, or new medications (including over-the-counter products) are added. *  Please carry medication information at all times in case of emergency situations. These are general instructions for a healthy lifestyle:    No smoking/ No tobacco products/ Avoid exposure to second hand smoke    Surgeon General's Warning:  Quitting smoking now greatly reduces serious risk to your health.     Obesity, smoking, and sedentary lifestyle greatly increases your risk for illness    A healthy diet, regular physical exercise & weight monitoring are important for maintaining a healthy lifestyle    You may be retaining fluid if you have a history of heart failure or if you experience any of the following symptoms:  Weight gain of 3 pounds or more overnight or 5 pounds in a week, increased swelling in our hands or feet or shortness of breath while lying flat in bed. Please call your doctor as soon as you notice any of these symptoms; do not wait until your next office visit. Recognize signs and symptoms of STROKE:    F-face looks uneven    A-arms unable to move or move unevenly    S-speech slurred or non-existent    T-time-call 911 as soon as signs and symptoms begin-DO NOT go       Back to bed or wait to see if you get better-TIME IS BRAIN. Warning Signs of HEART ATTACK     Call 911 if you have these symptoms:   Chest discomfort. Most heart attacks involve discomfort in the center of the chest that lasts more than a few minutes, or that goes away and comes back. It can feel like uncomfortable pressure, squeezing, fullness, or pain.  Discomfort in other areas of the upper body. Symptoms can include pain or discomfort in one or both arms, the back, neck, jaw, or stomach.  Shortness of breath with or without chest discomfort.  Other signs may include breaking out in a cold sweat, nausea, or lightheadedness. Don't wait more than five minutes to call 911 - MINUTES MATTER! Fast action can save your life. Calling 911 is almost always the fastest way to get lifesaving treatment. Emergency Medical Services staff can begin treatment when they arrive -- up to an hour sooner than if someone gets to the hospital by car. The discharge information has been reviewed with the patient. The patient verbalized understanding.     Discharge medications reviewed with the patient and appropriate educational materials and side effects teaching were provided. Urinary Tract Infections in Men: Care Instructions  Your Care Instructions    A urinary tract infection, or UTI, is a general term for an infection anywhere between the kidneys and the tip of the penis. UTIs can also be a result of a prostate problem. Most cause pain or burning when you urinate. Most UTIs are caused by bacteria and can be cured with antibiotics. It is important to complete your treatment so that the infection does not get worse. Follow-up care is a key part of your treatment and safety. Be sure to make and go to all appointments, and call your doctor if you are having problems. It's also a good idea to know your test results and keep a list of the medicines you take. How can you care for yourself at home? · Take your antibiotics as prescribed. Do not stop taking them just because you feel better. You need to take the full course of antibiotics. · Take your medicines exactly as prescribed. Your doctor may have prescribed a medicine, such as phenazopyridine (Pyridium), to help relieve pain when you urinate. This turns your urine orange. You may stop taking it when your symptoms get better. But be sure to take all of your antibiotics, which treat the infection. · Drink extra water for the next day or two. This will help make the urine less concentrated and help wash out the bacteria causing the infection. (If you have kidney, heart, or liver disease and have to limit your fluids, talk with your doctor before you increase your fluid intake.)  · Avoid drinks that are carbonated or have caffeine. They can irritate the bladder. · Urinate often. Try to empty your bladder each time. · To relieve pain, take a hot bath or lay a heating pad (set on low) over your lower belly or genital area. Never go to sleep with a heating pad in place. To help prevent UTIs  · Drink plenty of fluids, enough so that your urine is light yellow or clear like water.  If you have kidney, heart, or liver disease and have to limit fluids, talk with your doctor before you increase the amount of fluids you drink. · Urinate when you have the urge. Do not hold your urine for a long time. Urinate before you go to sleep. · Keep your penis clean. Catheter care  If you have a drainage tube (catheter) in place, the following steps will help you care for it. · Always wash your hands before and after touching your catheter. · Check the area around the urethra for inflammation or signs of infection. Signs of infection include irritated, swollen, red, or tender skin, or pus around the catheter. · Clean the area around the catheter with soap and water two times a day. Dry with a clean towel afterward. · Do not apply powder or lotion to the skin around the catheter. To empty the urine collection bag  · Wash your hands with soap and water. · Without touching the drain spout, remove the spout from its sleeve at the bottom of the collection bag. Open the valve on the spout. · Let the urine flow out of the bag and into the toilet or a container. Do not let the tubing or drain spout touch anything. · After you empty the bag, clean the end of the drain spout with tissue and water. Close the valve and put the drain spout back into its sleeve at the bottom of the collection bag. · Wash your hands with soap and water. When should you call for help? Call your doctor now or seek immediate medical care if:  ? · Symptoms such as a fever, chills, nausea, or vomiting get worse or happen for the first time. ? · You have new pain in your back just below your rib cage. This is called flank pain. ? · There is new blood or pus in your urine. ? · You are not able to take or keep down your antibiotics. ? Watch closely for changes in your health, and be sure to contact your doctor if:  ? · You are not getting better after taking an antibiotic for 2 days. ? · Your symptoms go away but then come back.    Where can you learn more?  Go to http://chetna-radha.info/. Enter U043 in the search box to learn more about \"Urinary Tract Infections in Men: Care Instructions. \"  Current as of: May 12, 2017  Content Version: 11.4  © 9278-1784 [employer], [employer]. Care instructions adapted under license by [employer] (which disclaims liability or warranty for this information). If you have questions about a medical condition or this instruction, always ask your healthcare professional. [place] [address] any warranty or liability for your use of this information.

## 2018-06-18 NOTE — PROGRESS NOTES
Patient's IV infiltrated. Patient has had multiple IV attempts made by this nurse and two other nurses. Patient refuses that the nurse should call urology on call for new IV access or antibotic route change.  Patient states don't call the Dr at this time, call when the office opens at [phone].

## 2018-06-18 NOTE — PROGRESS NOTES
Shift assessment complete. Patient alert and oriented x 4. Respirations even regular, and non-labored. Bowel sounds active and abdomen soft and intact. Denies pain. Bed low, locked,and call light within reach.

## 2018-06-18 NOTE — PROGRESS NOTES
[address] June 18, 2018       RE: Sarah Potts. To Whom It May Concern,    This is to certify that Sarah Potts. may return to work on Thursday, 6/21/2018, depending on if he is feeling well and continues to remain afebrile. If he is not feeling better, he may return on Monday, 6/25/2018. Please feel free to contact my office if you have any questions or concerns. Thank you for your assistance in this matter.       Sincerely,  Colonel Krystin NP   [phone]

## 2018-06-18 NOTE — PROGRESS NOTES
Discharge instructions and prescriptions provided and explained to the pt. Med side effect sheet reviewed. Opportunity for questions provided. Pt is getting an IV dose of abx at 10am, prior to discharge. Instructed to call once ready to leave.

## 2018-06-20 LAB
BACTERIA SPEC CULT: NORMAL
BACTERIA SPEC CULT: NORMAL
SERVICE CMNT-IMP: NORMAL
SERVICE CMNT-IMP: NORMAL

## 2019-07-15 ENCOUNTER — HOSPITAL ENCOUNTER (OUTPATIENT)
Dept: MRI IMAGING | Age: 54
Discharge: HOME | End: 2019-07-15
Attending: NEUROLOGICAL SURGERY
Payer: COMMERCIAL

## 2019-07-15 DIAGNOSIS — D32.9 MENINGIOMA (HCC): ICD-10-CM

## 2019-07-15 PROCEDURE — A9575 INJ GADOTERATE MEGLUMI 0.1ML: HCPCS | Performed by: NEUROLOGICAL SURGERY

## 2019-07-15 PROCEDURE — 74011250636 HC RX REV CODE- 250/636: Performed by: NEUROLOGICAL SURGERY

## 2019-07-15 PROCEDURE — 70553 MRI BRAIN STEM W/O & W/DYE: CPT

## 2019-07-15 RX ORDER — SODIUM CHLORIDE 0.9 % (FLUSH) 0.9 %
10 SYRINGE (ML) INJECTION
Status: COMPLETED | OUTPATIENT
Start: 2019-07-15 | End: 2019-07-15

## 2019-07-15 RX ORDER — GADOTERATE MEGLUMINE 376.9 MG/ML
20 INJECTION INTRAVENOUS
Status: COMPLETED | OUTPATIENT
Start: 2019-07-15 | End: 2019-07-15

## 2019-07-15 RX ADMIN — GADOTERATE MEGLUMINE 20 ML: 376.9 INJECTION INTRAVENOUS at 09:17

## 2019-07-15 RX ADMIN — Medication 10 ML: at 09:17

## 2019-07-16 PROBLEM — D32.9 MENINGIOMA (HCC): Status: ACTIVE | Noted: 2019-07-16

## 2020-07-28 NOTE — ED NOTES
Pt temp down. Finishing fluids.  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 The patient feels well and has no complaints. She denies N/V/D, fever, chills.  The patient was noted to have new proteinuria on labs on 02/01/2020 prompting a DSA to be drawn on 02/10/2020 that showed de Kari DSA to DQ 4 with MFI of 900 and UPCR of 385 milligrams/gram    7/28/20 : She has no complains. No changes in the urine. No N/V/D.   Her TAC was 19.3 on 6/26 :       Assessment/Plan:  High risk immunosuppression medications requiring intensive monitoring    1) S/p DDKT:  -Baseline Scr 0.5-0.7  -Immunosuppression as below    2) Immunosuppression:  -MMF 1g BID, prednisone 5mg PO daily, tac 1/0.5, tac goal has been 4-8, increase to 6-9 given de Kari DSA.  Will need to check another DSA with next blood work.   Her last 2 checks were high and it might a timing issues. Will repeat trough.     3) Hep B core Ab +:  -on lifelong entecavir 0.5mg   -Seen by Dr. Price on 10/14/19  -Plan for U/S of liver in 08/2020, follow up 10/2020    4) HTN:  -currently on amlodipine 10 mg p.o. daily.  She was started on lisinopril 5 mg p.o. daily given increasing proteinuria but she is not taken it. Will repeat another P/C.     5) NODAT:  -followed by endocrine, last seen 3/11/20. Her last A1c is 10. Worse than prior. She has an appointment with endo and she should work on getting her BG controlled.     6) Abnormal PAP:  -Was supposed to have repeat PAP 8/2018, not done.     7) Osteoporosis:  - She was on alendronate ? Not sure if she takes it. Will need another DEXA scan. Should resume on alendronate as  well. Should also follow with endo.   -PTH was 82 2019    8.) de Kari DSA:  -increase tacrolimus trough to 6-9, recheck DSA now.     Follow-up:   1. Next lab draw: q3m    2. Next clinic visit: 6m     3. Referring nephrologist: Dr. Fritsche      Transplant course:    1. Transplant date: 2011  2. Organ type DD  3. Primary disease: HTN  4. PRA/Cross match/HLA typin% PRA to Class I  5. Cold ischemia 24 hrs 52 min Warm ischemia time:37 min  6. Induction agents: Thymo  7. Maintenance immune suppression: tacro 6-9, cellcept 1000 bid, pred 5  8. Prophylactic antibiotics : NA  9. Post Operative complications/stent removal: NA  10. Serologies:             - CMV:  Donor: neg, Recipient: pos             - EBV: D pos    R pos             - Hepatitis B:  D neg,  R Hep B c ab pos on entacavir- Besur             - HIV: D neg R pos             - Others:  R Varicella IgG pos, quant gold neg  11. Allograft Function/baseline creatinine: 0.5-0.7  12. Episodes of rejection/ Biopsies: none  13. Post transplant Infections: UTI  14. Vaccinations:             - PNA 23: 2019             - PNA 13: 2018             - Influenza: yearly             - tDap: TD  - due              - Hep B: multiple             - Shingles: Varicella IgG pos, recommend Shingrix  15. Health Maintenance:             - Annual tests due date: NOV             - lipid panel: yearly             - Bone density: 2018 - 3.2 forearm- NOT on alendronate, repeat DEXA now. Resume on the former.              - Colonoscopy: 6/1/10 hyperplastic polyps (prior had adenoma) - 2017- next           2027 pe GI (? 2022 due to prior adenoma?)             - Dermatology: no skin cancers, saw 2019, follow up 2020             - Mammo: 2016 Mammo neg -  due             - Pap pelvic   2018: SQUAMOUS   ATYPICAL SQUAMOUS CELLS CANNOT EXCLUDE HIGH GRADE SQUAMOUS INTRAEPITHELIAL     LESION (ASC-H).  2018: ndocervix, curettage:   Mild dysplasia (JESSICA I)  and koilocytosis.  Colposcopy normal Follow up due now             - Native Kidney Imagin2017- atrophic - due     Vaccinations:     Most Recent Immunizations   Administered Date(s) Administered   • Hepatitis A - Adult 2015   • Influenza, injectable, quadrivalent 2020   • Influenza, seasonal, injectable, preservative free 2015   • Influenza, seasonal, injectable, trivalent 2013   • Pneumococcal Conjugate 13 valent 2018   • Pneumococcal polysaccharide, adult, 23 valent 04/10/2019   • Td:Adult type tetanus/diphtheria 2013   • Tdap 2012   • Typhoid, Vi capsular polysaccharide vaccine 2015   • Varicella 2005       Healthcare Maintenance:          Health Maintenance   Topic Date Due   • Diabetes Eye Exam  1972   • Hepatitis B Vaccine (1 of 3 - Risk 3-dose series) 1973   • Breast Cancer Screening  2018   • Medicare Wellness 65+  2019   • Diabetes A1C  2020   • Depression Screening  2020   • Diabetes Foot Exam  2020   • DM/CKD GFR  2021   • DM/CKD Microalbumin  02/10/2021   • DTaP/Tdap/Td Vaccine (3 - Td) 2023   • Pneumococcal Vaccine 65+ (2 of 2 - PPSV23) 04/10/2024   • Colorectal Cancer Screening-Colonoscopy  2027   • Osteoporosis Screening  Completed   • Influenza Vaccine  Completed   • Hepatitis C Screening  Completed   • Meningococcal Vaccine  Aged Out          MEDICATIONS:     Current Outpatient Medications   Medication Sig Dispense Refill   • TACROlimus (PROGRAF) 0.5 MG capsule Take 1 capsule by mouth daily. Indications: Kidney Transplant Recipients Take along with the 1 mg capsule to total 1 mg in the morning and 0.5 mg in the evenings. 30 capsule 11   • TACROlimus (PROGRAF) 1 MG capsule Take 1 capsule by mouth daily. Take along with the 0.5 mg tablet to equal 1 mg in the morning and 0.5 mg nightly. 30 capsule 11   • predniSONE (DELTASONE) 5 MG tablet Take 1 tablet by mouth daily. 90 tablet 3    • cloNIDine (CATAPRES) 0.2 MG tablet Take 1 tablet by mouth 2 times daily. 60 tablet 5   • insulin glargine (LANTUS SOLOSTAR) 100 UNIT/ML pen-injector Inject 4 Units into the skin nightly. MAX dose 20 units daily.  Prime 2 units first before each administration. 6 mL 3   • Insulin Lispro, 1 Unit Dial, (HUMALOG KWIKPEN) 100 UNIT/ML pen-injector Prime 2 units first before each administration.  Take 2 units for blood sugar 110-150, 4 units if blood sugar is 150-200.  Take 7 units if blood sugar is above 200.  Do not take if blood sugar is less than 110.  Twice a day.  E11.65.  MAX 15 units/day. 15 mL 2   • Vitamin D, Ergocalciferol, 1.25 mg (50,000 units) capsule Take 1 capsule by mouth every 30 days. 12 capsule 0   • entecavir (BARACLUDE) 0.5 MG tablet Take 1 tablet by mouth daily. 90 tablet 3   • blood glucose (ACCU-CHEK ARI PLUS) test strip Test blood sugar 3 times daily  Dx E11.9 100 each 3   • potassium chloride (KLOR-CON) 10 MEQ ER tablet Take 1 tablet by mouth daily. 90 tablet 3   • Insulin Pen Needle 31G X 6 MM Misc Indications: steriod induced diabetes Inject three times daily as directed 100 each 10   • amLODIPine (NORVASC) 10 MG tablet Take 1 tablet by mouth daily. 30 tablet 11   • mycophenolate (CELLCEPT) 500 MG tablet Take 2 tablets by mouth 2 times daily. Z94.0 360 tablet 11   • polyethylene glycol-electrolytes (NULYTELY WITH FLAVOR PACKS) 420 G solution Patient is to follow diet and bowel prep instructions provided by Gastroenterologist's office. 4000 mL 0   • B-D U/F PEN NEEDLE 5/16\" 31G X 8 MM Misc USE AS DIRECTED 4 TIMES DAILY BEFORE MEALS AND NIGHTLY 100 each 10   • Blood Glucose Monitoring Suppl (BLOOD GLUCOSE METER) KIT Accu chek ARI meter to check blood sugars 4 x daily 1 kit 0   • SOFTCLIX LANCETS MISC DRUM STYLE. Use to test blood sugar up to five times daily  Indications: steriod induced diabetes 150 each 1     No current facility-administered medications for this visit.        PMHx, PSH  & SOCHx:     Past Medical History:   Diagnosis Date   • Chronic kidney disease     s/p kidney transplant   • Diabetes mellitus (CMS/HCC)    • Essential (primary) hypertension    • H/O kidney transplant    • Nuclear sclerotic cataract of both eyes     trace NS   • Osteoporosis, unspecified 2011     Past Surgical History:   Procedure Laterality Date   • Av fistula placement     •  section, classic     • Colonoscopy N/A 2017    Jacob- Repeat Colonoscopy in 10 years.    • Colonoscopy remove lesion by snare  2005   • Dexa bone density axial skeleton  11/10/2005   • Dexa bone density axial skeleton  2011   • Kidney transplant      2011   • Mammo screening bilateral  2011   • Pap smear,routine  2012     Social History     Socioeconomic History   • Marital status: Legally      Spouse name: Not on file   • Number of children: Not on file   • Years of education: Not on file   • Highest education level: Not on file   Occupational History   • Occupation: unemployed   Social Needs   • Financial resource strain: Not on file   • Food insecurity:     Worry: Not on file     Inability: Not on file   • Transportation needs:     Medical: Not on file     Non-medical: Not on file   Tobacco Use   • Smoking status: Never Smoker   • Smokeless tobacco: Never Used   Substance and Sexual Activity   • Alcohol use: No   • Drug use: No   • Sexual activity: Not on file   Lifestyle   • Physical activity:     Days per week: Not on file     Minutes per session: Not on file   • Stress: Not on file   Relationships   • Social connections:     Talks on phone: Not on file     Gets together: Not on file     Attends Druze service: Not on file     Active member of club or organization: Not on file     Attends meetings of clubs or organizations: Not on file     Relationship status: Not on file   • Intimate partner violence:     Fear of current or ex partner: Not on file     Emotionally  abused: Not on file     Physically abused: Not on file     Forced sexual activity: Not on file   Other Topics Concern   • Not on file   Social History Narrative    Hmong speaking        BLOOD PRESSURE & WEIGHT:     BP Readings from Last 3 Encounters:   03/11/20 104/74   12/06/19 120/74   10/16/19 110/60     Wt Readings from Last 3 Encounters:   03/11/20 66.9 kg   12/06/19 66.8 kg   10/16/19 65.9 kg       PE & ROS:     Physical Exam:  Not performed as not a patient visit          LABORATORY DATA:     WBC (K/mcL)   Date Value   02/01/2020 5.4   10/15/2019 6.1     RBC (mil/mcL)   Date Value   02/01/2020 3.91 (L)   10/15/2019 4.18     HCT (%)   Date Value   02/01/2020 36.7   10/15/2019 38.3     HGB (g/dL)   Date Value   02/01/2020 12.3   10/15/2019 12.5     PLT (K/mcL)   Date Value   02/01/2020 244   10/15/2019 264   02/05/2014 201     Sodium (mmol/L)   Date Value   02/01/2020 138   10/15/2019 142     Potassium (mmol/L)   Date Value   02/01/2020 4.1   10/15/2019 4.0     Chloride (mmol/L)   Date Value   02/01/2020 105   10/15/2019 109 (H)     Glucose (mg/dL)   Date Value   02/01/2020 179 (H)   10/15/2019 97     CALCIUM (mg/dL)   Date Value   10/15/2019 9.7     Calcium (mg/dL)   Date Value   02/01/2020 9.2     Carbon Dioxide (mmol/L)   Date Value   02/01/2020 27   10/15/2019 27     BUN (mg/dL)   Date Value   02/01/2020 10   10/15/2019 7     Creatinine (mg/dL)   Date Value   02/01/2020 0.51   10/15/2019 0.52       Tacrolimus Levels:  Lab Results   Component Value Date    TACRO 15.7 02/01/2020    TACRO 6.4 10/15/2019    TACRO 6.6 07/16/2019    TACRO 6.7 05/16/2019    TACRO 5.2 04/04/2019       Graft Function:  Lab Results   Component Value Date    CREATININE 0.51 02/01/2020    CREATININE 0.52 10/15/2019    CREATININE 0.57 07/16/2019    CREATININE 0.44 (L) 05/16/2019    CREATININE 0.57 04/04/2019        Antibodies:   No results found for: HCOMM    Proteinuria:  Lab Results   Component Value Date    PRCR 385 (H) 02/10/2020     PRCR 1,701 (H) 02/01/2020    PRCR 254 (H) 01/10/2019       Electrolytes  Lab Results   Component Value Date    SODIUM 138 02/01/2020    SODIUM 142 10/15/2019    SODIUM 139 07/16/2019    POTASSIUM 4.1 02/01/2020    POTASSIUM 4.0 10/15/2019    POTASSIUM 3.9 07/16/2019    CO2 27 02/01/2020    CO2 27 10/15/2019    CO2 27 07/16/2019    MG 1.7 02/01/2020    MG 1.6 (L) 01/10/2019    MG 1.5 (L) 12/11/2017       Parathyroid, Calcium, Phosphorus and Vitamin D:  Lab Results   Component Value Date    INTAC 82 01/10/2019    INTAC 94 (H) 12/11/2017    INTAC 134 (H) 08/07/2017    VITD25 41.3 02/01/2020    VITD25 62.0 01/10/2019    VITD25 57.9 12/11/2017    CALCIUM 9.2 02/01/2020    CALCIUM 9.7 10/15/2019    CALCIUM 9.2 07/16/2019    PHOS 3.7 02/01/2020    PHOS 3.9 01/10/2019    PHOS 4.1 12/11/2017       Complete Blood Count:  Lab Results   Component Value Date    HGB 12.3 02/01/2020    HGB 12.5 10/15/2019    HGB 12.0 07/16/2019    WBC 5.4 02/01/2020    WBC 6.1 10/15/2019    WBC 7.1 07/16/2019     02/01/2020     10/15/2019     07/16/2019       Iron Studies:  Lab Results   Component Value Date    IRON 57 08/16/2018    PST 32 08/16/2018       BK Plasma PCR:  1/2019: negative    CMV Plasma PCR:  Lab Results   Component Value Date    CMVQNM <150 (A) 06/05/2012    CMVQNM 229 (H) 05/30/2012    CMVQNM NOT DETECTED 05/16/2012         Diabetes:  Lab Results   Component Value Date    GLUCOSE 179 (H) 02/01/2020    GLUCOSE 97 10/15/2019    GLUCOSE 170 (H) 07/16/2019     Lab Results   Component Value Date    HGBA1C 8.6 (H) 02/01/2020    HGBA1C 9.3 (H) 08/08/2019    HGBA1C 7.7 (H) 01/10/2019        Lipids:  Lab Results   Component Value Date    TRIGLYCERIDE 149 02/01/2020    CHOLESTEROL 225 (H) 02/01/2020    HDL 61 02/01/2020

## 2021-05-18 ENCOUNTER — HOSPITAL ENCOUNTER (OUTPATIENT)
Dept: MRI IMAGING | Age: 56
Discharge: HOME | End: 2021-05-18
Attending: NEUROLOGICAL SURGERY
Payer: COMMERCIAL

## 2021-05-18 DIAGNOSIS — D32.9 MENINGIOMA (HCC): ICD-10-CM

## 2021-05-18 PROCEDURE — 70551 MRI BRAIN STEM W/O DYE: CPT

## 2021-06-01 PROBLEM — Z91.041 ALLERGY TO RADIOGRAPHIC CONTRAST MEDIA: Status: ACTIVE | Noted: 2021-06-01

## 2022-03-18 PROBLEM — D32.9 MENINGIOMA (HCC): Status: ACTIVE | Noted: 2019-07-16

## 2022-03-19 PROBLEM — T81.40XA POST OP INFECTION: Status: ACTIVE | Noted: 2018-06-16

## 2022-03-20 PROBLEM — Z91.041 ALLERGY TO RADIOGRAPHIC CONTRAST MEDIA: Status: ACTIVE | Noted: 2021-06-01

## 2022-04-21 PROCEDURE — 88305 TISSUE EXAM BY PATHOLOGIST: CPT

## 2022-04-22 ENCOUNTER — HOSPITAL ENCOUNTER (OUTPATIENT)
Dept: LAB | Age: 57
Discharge: HOME | End: 2022-04-22

## 2023-01-03 ENCOUNTER — TELEPHONE (OUTPATIENT)
Dept: NEUROSURGERY | Age: 58
End: 2023-01-03

## 2023-01-03 DIAGNOSIS — D32.9 BENIGN NEOPLASM OF MENINGES, UNSPECIFIED (HCC): Primary | ICD-10-CM

## 2023-01-03 NOTE — TELEPHONE ENCOUNTER
Patient contacted me about his 2 year follow up with  MRI of the brain  Patient is unable to have contrast  Will place MRI order on same MRI unit [facility]  After MRI is scheduled he will call to make a follow up with Dr. Yuli Linton

## 2023-09-13 ENCOUNTER — OFFICE VISIT (OUTPATIENT)
Dept: ORTHOPEDIC SURGERY | Age: 58
End: 2023-09-13
Payer: COMMERCIAL

## 2023-09-13 DIAGNOSIS — M25.572 ACUTE BILATERAL ANKLE PAIN: Primary | ICD-10-CM

## 2023-09-13 DIAGNOSIS — M76.62 ACHILLES TENDINITIS, LEFT LEG: ICD-10-CM

## 2023-09-13 DIAGNOSIS — M25.571 BILATERAL ANKLE PAIN, UNSPECIFIED CHRONICITY: ICD-10-CM

## 2023-09-13 DIAGNOSIS — M76.71 PERONEAL TENDINITIS OF RIGHT LOWER EXTREMITY: ICD-10-CM

## 2023-09-13 DIAGNOSIS — M25.572 BILATERAL ANKLE PAIN, UNSPECIFIED CHRONICITY: ICD-10-CM

## 2023-09-13 DIAGNOSIS — M25.371 RIGHT ANKLE INSTABILITY: ICD-10-CM

## 2023-09-13 DIAGNOSIS — M25.571 ACUTE BILATERAL ANKLE PAIN: Primary | ICD-10-CM

## 2023-09-13 PROCEDURE — 99204 OFFICE O/P NEW MOD 45 MIN: CPT | Performed by: ORTHOPAEDIC SURGERY

## 2023-09-13 RX ORDER — FEXOFENADINE HCL 180 MG/1
180 TABLET ORAL
COMMUNITY
Start: 2023-03-28

## 2023-09-13 RX ORDER — NITROGLYCERIN 0.4 MG/1
0.4 TABLET SUBLINGUAL EVERY 5 MIN PRN
COMMUNITY
Start: 2021-07-14

## 2023-09-13 RX ORDER — FENOFIBRATE 160 MG/1
160 TABLET ORAL DAILY
COMMUNITY
Start: 2023-09-03

## 2023-09-13 RX ORDER — FAMOTIDINE 10 MG
10 TABLET ORAL
COMMUNITY
Start: 2023-03-28

## 2023-09-13 RX ORDER — MOLNUPIRAVIR 200 MG/1
CAPSULE ORAL
COMMUNITY
Start: 2023-09-03

## 2023-09-13 RX ORDER — PHENOL 1.4 %
AEROSOL, SPRAY (ML) MUCOUS MEMBRANE
COMMUNITY
Start: 2022-03-30

## 2023-09-13 RX ORDER — GLIMEPIRIDE 2 MG/1
TABLET ORAL
COMMUNITY
Start: 2023-08-04

## 2023-09-13 NOTE — PROGRESS NOTES
Name: Skyla Soto. YOB: 1965  Gender: male  MRN: [id number]    Summary:   Bilateral cavovarus feet with right chronic lateral ankle instability and peroneal tendinitis, left insertional Achilles tendinitis with Ирина's deformity       CC: No chief complaint on file. HPI: Skyla Soto. is a 62 y.o. male who presents with No chief complaint on file. .  This patient presents the office today with ongoing and worsening pain located in his bilateral ankles. The ankle on the right is more unstable and has more lateral sided pain with activity. At this point is beginning to really affect his activities of daily living. The left Achilles also has pain posteriorly he got after stepping into a hole while cutting grass several months ago. History was obtained by Patient     ROS/Meds/PSH/PMH/FH/SH: I personally reviewed the patients standard intake form. Below are the pertinents    Tobacco:  reports that he has never smoked. He has never used smokeless tobacco.  Diabetes: None      Physical Examination:    Exam of the bilateral ankles shows cavovarus feet bilaterally. Used to produce these correct with [facility] block testing. He does have tenderness to palpation and fullness over the peroneal tendons laterally in the right ankle with increased talar tilt laxity and anterior drawer Lachman and laxity compared to the left. On the left he has insertional Achilles tendinitis pain with no evidence of insufficiency identified. Imaging:   I independently interpreted XR taken today  Bilateral feet XR: AP, Lateral, Oblique views     ICD-10-CM    1. Acute bilateral ankle pain  M25.571 XR Foot Standard Bilateral    M25.572       2. Bilateral ankle pain, unspecified chronicity  M25.571 XR ANKLE STANDARD BILATERAL    M25.572       3. Right ankle instability  M25.371       4. Peroneal tendinitis of right lower extremity  M76.71       5.  Achilles tendinitis, left leg  M76.62

## 2023-09-27 ENCOUNTER — OFFICE VISIT (OUTPATIENT)
Dept: ORTHOPEDIC SURGERY | Age: 58
End: 2023-09-27
Payer: COMMERCIAL

## 2023-09-27 DIAGNOSIS — M76.71 PERONEAL TENDINITIS OF RIGHT LOWER EXTREMITY: ICD-10-CM

## 2023-09-27 DIAGNOSIS — M25.371 RIGHT ANKLE INSTABILITY: Primary | ICD-10-CM

## 2023-09-27 PROCEDURE — 99214 OFFICE O/P EST MOD 30 MIN: CPT | Performed by: ORTHOPAEDIC SURGERY

## 2023-09-27 NOTE — PROGRESS NOTES
Name: Mariaelena Ignacio. YOB: 1965  Gender: male  MRN: [id number]    Summary:   Right plantar lateral ankle instability with cavus foot and peroneal tendinitis       CC: Follow-up (MRI results )       HPI: Mariaelena Saucedo is a 62 y.o. male who presents with Follow-up (MRI results )  . This patient presents back to the office today for follow-up of his MRI with continued complaints of right ankle instability and pain. The pain is really affecting his activities of daily because he cannot exercise or even walk for exercise at present. He is tried lace up bracing for this as well as PT and home exercise program without much benefit. History was obtained by Patient     ROS/Meds/PSH/PMH/FH/SH: I personally reviewed the patients standard intake form. Below are the pertinents    Tobacco:  reports that he has never smoked. He has never used smokeless tobacco.  Diabetes: None      Physical Examination:      Exam of the right lower extremity shows a cavovarus foot with correct with [facility] block testing. He does have tenderness to palpation and fullness over the peroneal tendons. There is some instability to talar tilt testing and anterior drawer testing. He has palpable pulses and intact sensation. There is no evidence of generalized ligament laxity.   Imaging:   I independently interpreted the MRI I ordered of the of the right ankle which shows peroneal tenosynovitis as well as an attenuated ATFL           Sandra Pope III, MD           Assessment:   Right chronic lateral ankle instability with peroneal tendinitis and cavovarus foot    Treatment Plan:   4 This is a chronic illness/condition with exacerbation and progression  Treatment at this time: Elective major surgery with procedural risk factors  Studies ordered: NO XR needed @ Next Visit    Weight-bearing status: WBAT        Return to work/work restrictions: none-after surgery out of work 12 weeks  No medications

## 2023-09-28 DIAGNOSIS — M76.71 PERONEAL TENDINITIS OF RIGHT LOWER EXTREMITY: ICD-10-CM

## 2023-09-28 DIAGNOSIS — M25.371 RIGHT ANKLE INSTABILITY: ICD-10-CM

## 2023-09-29 ENCOUNTER — CLINICAL DOCUMENTATION (OUTPATIENT)
Dept: ORTHOPEDIC SURGERY | Age: 58
End: 2023-09-29

## 2023-10-04 ENCOUNTER — TELEPHONE (OUTPATIENT)
Dept: ORTHOPEDIC SURGERY | Age: 58
End: 2023-10-04

## 2023-10-04 ENCOUNTER — CLINICAL DOCUMENTATION (OUTPATIENT)
Dept: ORTHOPEDIC SURGERY | Age: 58
End: 2023-10-04

## 2023-10-04 NOTE — TELEPHONE ENCOUNTER
She left a message that Dr. Bao Ferrer would like for you to contact the patients PCP for Eliquis hold. He is not on this for a cardiac reason.

## 2023-10-06 ENCOUNTER — CLINICAL DOCUMENTATION (OUTPATIENT)
Dept: ORTHOPEDIC SURGERY | Age: 58
End: 2023-10-06

## 2023-10-06 NOTE — PROGRESS NOTES
Patient requested copy of FMLA form he submitted be faxed to [employer], copy faxed receipt shows received.

## 2023-10-10 ENCOUNTER — CLINICAL DOCUMENTATION (OUTPATIENT)
Dept: ORTHOPEDIC SURGERY | Age: 58
End: 2023-10-10

## 2023-10-10 NOTE — PROGRESS NOTES
[address] [address], [facility]    [phone] (Work)    [phone] (Fax)       Called PCP to get clearance they said they only got part of the fax to please refax for the medication hold

## 2023-10-12 ENCOUNTER — TELEPHONE (OUTPATIENT)
Dept: ORTHOPEDIC SURGERY | Age: 58
End: 2023-10-12

## 2023-10-12 NOTE — TELEPHONE ENCOUNTER
Patient called wanting to verify that everything a go for surgery on 10/17/23. He stated \"they\" are supposed to call 3 days before surgery, he said that would be on Saturday or Sunday. He will be working and doesn't want to miss the call. He wants to do the \"questions\" today or tomorrow. He didn't want to miss the call.     Please contact him [phone]

## 2023-10-12 NOTE — TELEPHONE ENCOUNTER
Spoke to patient and informed him to hold the Eliquis 3 days prior to surgery. Also informed patient the hospital will call him the day before surgery to tell him when to arrive at [facility] and when to stop eating.

## 2023-10-12 NOTE — PERIOP NOTE
Patient verified name and . Order for consent NOT found in EHR at time of PAT visit. Unable to verify case posting against order; surgery verified by patient. Type 1B surgery, phone assessment complete. Orders not received. Labs per surgeon: none ordered  Labs per anesthesia protocol: SQBS s/h for DOS    Patient answered medical/surgical history questions at their best of ability. All prior to admission medications documented in EPIC. Patient instructed to take the following medications the day of surgery according to anesthesia guidelines with a small sip of water: clonazepam if needed, fexofenadine, metoprolol, levothyroxine. On the day before surgery please take Tylenol (Acetaminophen) 1000mg in the morning and then again before bed. You may substitute for Tylenol 650 mg. Hold all vitamins, supplements and NSAIDS (excedrin)  now for  surgery. Prescription meds to hold: eliquis hold as instructed by surgeon. Patient states he was instructed to hold eliquis for 3 days prior to surgery. Hold the morning of surgery:  glimepiride, fenofibrate, losartan    Patient instructed on the following:  > Bring nitroglycerin to the hospital  > Arrive at [address] [place] [address], time of arrival to be called the day before by 1700  > NPO after midnight, unless otherwise indicated, including gum, mints, and ice chips  > Responsible adult must drive patient to the hospital, stay during surgery, and patient will need supervision 24 hours after anesthesia  > Use antibacterial soap in shower the night before surgery and on the morning of surgery  > All piercings must be removed prior to arrival.    > Leave all valuables (money and jewelry) at home but bring insurance card and ID on DOS.   > You may be required to pay a deductible or co-pay on the day of your procedure. You can pre-pay by calling [phone] if your surgery is at the [facility] [place] or [phone] if your surgery is at the [facility].   > Do not wear make-up,

## 2023-10-13 NOTE — PERIOP NOTE
The following records have been requested from [facility]:       [address] Records:    Please send last anesthesia record fax to [phone]. Thank you!

## 2023-10-16 ENCOUNTER — ANESTHESIA EVENT (OUTPATIENT)
Dept: SURGERY | Age: 58
End: 2023-10-16
Payer: COMMERCIAL

## 2023-10-16 NOTE — PERIOP NOTE
Preop department called to notify patient of arrival time for scheduled procedure. Instructions given to   - Arrive at [address] [facility]. - Remain NPO after midnight, unless otherwise indicated, including gum, mints, and ice chips. - Have a responsible adult to drive patient to the hospital, stay during surgery, and patient will need supervision 24 hours after anesthesia. - Use antibacterial soap in shower the night before surgery and on the morning of surgery.        Was patient contacted: yes  Voicemail left:   Numbers contacted: [phone]   Arrival time: 4021

## 2023-10-17 ENCOUNTER — APPOINTMENT (OUTPATIENT)
Dept: GENERAL RADIOLOGY | Age: 58
End: 2023-10-17
Attending: ORTHOPAEDIC SURGERY
Payer: COMMERCIAL

## 2023-10-17 ENCOUNTER — HOSPITAL ENCOUNTER (OUTPATIENT)
Age: 58
Setting detail: OUTPATIENT SURGERY
Discharge: HOME | End: 2023-10-17
Attending: ORTHOPAEDIC SURGERY | Admitting: ORTHOPAEDIC SURGERY
Payer: COMMERCIAL

## 2023-10-17 ENCOUNTER — ANESTHESIA (OUTPATIENT)
Dept: SURGERY | Age: 58
End: 2023-10-17
Payer: COMMERCIAL

## 2023-10-17 VITALS
SYSTOLIC BLOOD PRESSURE: 111 MMHG | TEMPERATURE: 97.9 F | HEART RATE: 79 BPM | OXYGEN SATURATION: 97 % | DIASTOLIC BLOOD PRESSURE: 71 MMHG | BODY MASS INDEX: 27.83 KG/M2 | HEIGHT: 73 IN | RESPIRATION RATE: 18 BRPM | WEIGHT: 210 LBS

## 2023-10-17 DIAGNOSIS — G89.18 ACUTE POSTOPERATIVE PAIN: Primary | ICD-10-CM

## 2023-10-17 LAB
GLUCOSE BLD STRIP.AUTO-MCNC: 220 MG/DL (ref 65–100)
SERVICE CMNT-IMP: ABNORMAL

## 2023-10-17 PROCEDURE — 6360000002 HC RX W HCPCS: Performed by: ANESTHESIOLOGY

## 2023-10-17 PROCEDURE — 7100000000 HC PACU RECOVERY - FIRST 15 MIN: Performed by: ORTHOPAEDIC SURGERY

## 2023-10-17 PROCEDURE — 2720000010 HC SURG SUPPLY STERILE: Performed by: ORTHOPAEDIC SURGERY

## 2023-10-17 PROCEDURE — 6360000002 HC RX W HCPCS

## 2023-10-17 PROCEDURE — 2500000003 HC RX 250 WO HCPCS: Performed by: ANESTHESIOLOGY

## 2023-10-17 PROCEDURE — 2709999900 HC NON-CHARGEABLE SUPPLY: Performed by: ORTHOPAEDIC SURGERY

## 2023-10-17 PROCEDURE — 3700000001 HC ADD 15 MINUTES (ANESTHESIA): Performed by: ORTHOPAEDIC SURGERY

## 2023-10-17 PROCEDURE — 64445 NJX AA&/STRD SCIATIC NRV IMG: CPT | Performed by: ANESTHESIOLOGY

## 2023-10-17 PROCEDURE — 7100000010 HC PHASE II RECOVERY - FIRST 15 MIN: Performed by: ORTHOPAEDIC SURGERY

## 2023-10-17 PROCEDURE — 6360000002 HC RX W HCPCS: Performed by: NURSE PRACTITIONER

## 2023-10-17 PROCEDURE — 3600000004 HC SURGERY LEVEL 4 BASE: Performed by: ORTHOPAEDIC SURGERY

## 2023-10-17 PROCEDURE — 64447 NJX AA&/STRD FEMORAL NRV IMG: CPT | Performed by: ANESTHESIOLOGY

## 2023-10-17 PROCEDURE — 3600000014 HC SURGERY LEVEL 4 ADDTL 15MIN: Performed by: ORTHOPAEDIC SURGERY

## 2023-10-17 PROCEDURE — 2580000003 HC RX 258: Performed by: ANESTHESIOLOGY

## 2023-10-17 PROCEDURE — 2500000003 HC RX 250 WO HCPCS

## 2023-10-17 PROCEDURE — 82962 GLUCOSE BLOOD TEST: CPT

## 2023-10-17 PROCEDURE — 3700000000 HC ANESTHESIA ATTENDED CARE: Performed by: ORTHOPAEDIC SURGERY

## 2023-10-17 PROCEDURE — C1713 ANCHOR/SCREW BN/BN,TIS/BN: HCPCS | Performed by: ORTHOPAEDIC SURGERY

## 2023-10-17 PROCEDURE — 7100000001 HC PACU RECOVERY - ADDTL 15 MIN: Performed by: ORTHOPAEDIC SURGERY

## 2023-10-17 DEVICE — DEVICE GRFT FIX FOR LIGMNT AUG ANCHR REP PEEK INT BRAC: Type: IMPLANTABLE DEVICE | Site: ANKLE | Status: FUNCTIONAL

## 2023-10-17 DEVICE — STAPLE INT W20XL20MM NIT COMPR DYNANITE: Type: IMPLANTABLE DEVICE | Site: ANKLE | Status: FUNCTIONAL

## 2023-10-17 DEVICE — ANCHOR SUT NDL DX FIBERTAK: Type: IMPLANTABLE DEVICE | Site: ANKLE | Status: FUNCTIONAL

## 2023-10-17 RX ORDER — HYDROMORPHONE HYDROCHLORIDE 2 MG/ML
0.5 INJECTION, SOLUTION INTRAMUSCULAR; INTRAVENOUS; SUBCUTANEOUS EVERY 10 MIN PRN
Status: DISCONTINUED | OUTPATIENT
Start: 2023-10-17 | End: 2023-10-17 | Stop reason: HOSPADM

## 2023-10-17 RX ORDER — FENTANYL CITRATE 50 UG/ML
25 INJECTION, SOLUTION INTRAMUSCULAR; INTRAVENOUS EVERY 5 MIN PRN
Status: DISCONTINUED | OUTPATIENT
Start: 2023-10-17 | End: 2023-10-17 | Stop reason: HOSPADM

## 2023-10-17 RX ORDER — DIPHENHYDRAMINE HYDROCHLORIDE 50 MG/ML
12.5 INJECTION INTRAMUSCULAR; INTRAVENOUS
Status: DISCONTINUED | OUTPATIENT
Start: 2023-10-17 | End: 2023-10-17 | Stop reason: HOSPADM

## 2023-10-17 RX ORDER — OXYCODONE HYDROCHLORIDE 5 MG/1
5 TABLET ORAL
Status: DISCONTINUED | OUTPATIENT
Start: 2023-10-17 | End: 2023-10-17 | Stop reason: HOSPADM

## 2023-10-17 RX ORDER — SODIUM CHLORIDE 0.9 % (FLUSH) 0.9 %
5-40 SYRINGE (ML) INJECTION EVERY 12 HOURS SCHEDULED
Status: DISCONTINUED | OUTPATIENT
Start: 2023-10-17 | End: 2023-10-17 | Stop reason: HOSPADM

## 2023-10-17 RX ORDER — OXYCODONE HYDROCHLORIDE 5 MG/1
5 TABLET ORAL EVERY 6 HOURS PRN
Qty: 20 TABLET | Refills: 0 | Status: SHIPPED | OUTPATIENT
Start: 2023-10-17 | End: 2023-10-22

## 2023-10-17 RX ORDER — MIDAZOLAM HYDROCHLORIDE 2 MG/2ML
2 INJECTION, SOLUTION INTRAMUSCULAR; INTRAVENOUS
Status: COMPLETED | OUTPATIENT
Start: 2023-10-17 | End: 2023-10-17

## 2023-10-17 RX ORDER — SODIUM CHLORIDE, SODIUM LACTATE, POTASSIUM CHLORIDE, CALCIUM CHLORIDE 600; 310; 30; 20 MG/100ML; MG/100ML; MG/100ML; MG/100ML
INJECTION, SOLUTION INTRAVENOUS CONTINUOUS
Status: DISCONTINUED | OUTPATIENT
Start: 2023-10-17 | End: 2023-10-17 | Stop reason: HOSPADM

## 2023-10-17 RX ORDER — DEXAMETHASONE SODIUM PHOSPHATE 10 MG/ML
INJECTION, SOLUTION INTRAMUSCULAR; INTRAVENOUS
Status: COMPLETED | OUTPATIENT
Start: 2023-10-17 | End: 2023-10-17

## 2023-10-17 RX ORDER — FENTANYL CITRATE 50 UG/ML
100 INJECTION, SOLUTION INTRAMUSCULAR; INTRAVENOUS
Status: DISCONTINUED | OUTPATIENT
Start: 2023-10-17 | End: 2023-10-17 | Stop reason: HOSPADM

## 2023-10-17 RX ORDER — SODIUM CHLORIDE 9 MG/ML
INJECTION, SOLUTION INTRAVENOUS PRN
Status: DISCONTINUED | OUTPATIENT
Start: 2023-10-17 | End: 2023-10-17 | Stop reason: HOSPADM

## 2023-10-17 RX ORDER — SCOLOPAMINE TRANSDERMAL SYSTEM 1 MG/1
1 PATCH, EXTENDED RELEASE TRANSDERMAL
Status: DISCONTINUED | OUTPATIENT
Start: 2023-10-17 | End: 2023-10-17 | Stop reason: HOSPADM

## 2023-10-17 RX ORDER — METOCLOPRAMIDE HYDROCHLORIDE 5 MG/ML
10 INJECTION INTRAMUSCULAR; INTRAVENOUS
Status: DISCONTINUED | OUTPATIENT
Start: 2023-10-17 | End: 2023-10-17 | Stop reason: HOSPADM

## 2023-10-17 RX ORDER — PROPOFOL 10 MG/ML
INJECTION, EMULSION INTRAVENOUS CONTINUOUS PRN
Status: DISCONTINUED | OUTPATIENT
Start: 2023-10-17 | End: 2023-10-17 | Stop reason: SDUPTHER

## 2023-10-17 RX ORDER — LIDOCAINE HYDROCHLORIDE 20 MG/ML
INJECTION, SOLUTION EPIDURAL; INFILTRATION; INTRACAUDAL; PERINEURAL PRN
Status: DISCONTINUED | OUTPATIENT
Start: 2023-10-17 | End: 2023-10-17 | Stop reason: SDUPTHER

## 2023-10-17 RX ORDER — ONDANSETRON 2 MG/ML
4 INJECTION INTRAMUSCULAR; INTRAVENOUS
Status: DISCONTINUED | OUTPATIENT
Start: 2023-10-17 | End: 2023-10-17 | Stop reason: HOSPADM

## 2023-10-17 RX ORDER — SODIUM CHLORIDE 0.9 % (FLUSH) 0.9 %
5-40 SYRINGE (ML) INJECTION PRN
Status: DISCONTINUED | OUTPATIENT
Start: 2023-10-17 | End: 2023-10-17 | Stop reason: HOSPADM

## 2023-10-17 RX ORDER — BUPIVACAINE HYDROCHLORIDE AND EPINEPHRINE 5; 5 MG/ML; UG/ML
INJECTION, SOLUTION EPIDURAL; INTRACAUDAL; PERINEURAL
Status: COMPLETED | OUTPATIENT
Start: 2023-10-17 | End: 2023-10-17

## 2023-10-17 RX ORDER — CEPHALEXIN 500 MG/1
500 CAPSULE ORAL 4 TIMES DAILY
Qty: 12 CAPSULE | Refills: 0 | Status: SHIPPED | OUTPATIENT
Start: 2023-10-17

## 2023-10-17 RX ORDER — BUPIVACAINE HYDROCHLORIDE 5 MG/ML
INJECTION, SOLUTION EPIDURAL; INTRACAUDAL
Status: COMPLETED | OUTPATIENT
Start: 2023-10-17 | End: 2023-10-17

## 2023-10-17 RX ADMIN — MIDAZOLAM 2 MG: 1 INJECTION INTRAMUSCULAR; INTRAVENOUS at 08:52

## 2023-10-17 RX ADMIN — SODIUM CHLORIDE, POTASSIUM CHLORIDE, SODIUM LACTATE AND CALCIUM CHLORIDE: 600; 310; 30; 20 INJECTION, SOLUTION INTRAVENOUS at 09:02

## 2023-10-17 RX ADMIN — PHENYLEPHRINE HYDROCHLORIDE 100 MCG: 0.1 INJECTION, SOLUTION INTRAVENOUS at 11:03

## 2023-10-17 RX ADMIN — DEXAMETHASONE SODIUM PHOSPHATE 5 MG: 10 INJECTION, SOLUTION INTRAMUSCULAR; INTRAVENOUS at 08:54

## 2023-10-17 RX ADMIN — BUPIVACAINE HYDROCHLORIDE 10 ML: 5 INJECTION, SOLUTION EPIDURAL; INTRACAUDAL; PERINEURAL at 08:54

## 2023-10-17 RX ADMIN — Medication 2000 MG: at 10:27

## 2023-10-17 RX ADMIN — MEPIVACAINE HYDROCHLORIDE 20 ML: 15 INJECTION, SOLUTION EPIDURAL; INFILTRATION at 08:54

## 2023-10-17 RX ADMIN — LIDOCAINE HYDROCHLORIDE 40 MG: 20 INJECTION, SOLUTION EPIDURAL; INFILTRATION; INTRACAUDAL; PERINEURAL at 10:25

## 2023-10-17 RX ADMIN — PROPOFOL 180 MCG/KG/MIN: 10 INJECTION, EMULSION INTRAVENOUS at 10:25

## 2023-10-17 RX ADMIN — BUPIVACAINE HYDROCHLORIDE AND EPINEPHRINE BITARTRATE 20 ML: 5; .005 INJECTION, SOLUTION EPIDURAL; INTRACAUDAL; PERINEURAL at 08:57

## 2023-10-17 ASSESSMENT — PAIN SCALES - GENERAL: PAINLEVEL_OUTOF10: 0

## 2023-10-17 NOTE — PERIOP NOTE
PACU DISCHARGE NOTE    Renny Bustillospshire voiced understanding of discharge instructions. Per Dr. Greg Lacey patient is to resume Eliquis tonight. No other questions at this time. Patient has crutches and knee scooter for ambulation. Vital signs stable, pain well controlled, alert and oriented times three or at baseline, follow up per surgeon, no anesthetic complications.

## 2023-10-17 NOTE — ANESTHESIA PRE PROCEDURE
III  TM distance: >3 FB   Neck ROM: full     Dental:          Pulmonary:Negative Pulmonary ROS and normal exam    (+) sleep apnea: on noncompliant,                             Cardiovascular:  Exercise tolerance: good (>4 METS),   (+) hypertension:,         Rhythm: regular  Rate: normal                    Neuro/Psych:   Negative Neuro/Psych ROS              GI/Hepatic/Renal: Neg GI/Hepatic/Renal ROS  (+) GERD: well controlled,           Endo/Other: Negative Endo/Other ROS   (+) hypothyroidism::., .          Pt had no PAT visit       Abdominal:             Vascular: negative vascular ROS. Other Findings:           Anesthesia Plan      general     ASA 3       Induction: intravenous. MIPS: Postoperative opioids intended and Prophylactic antiemetics administered. Anesthetic plan and risks discussed with patient.       Plan discussed with surgical team.          Post-op pain plan if not by surgeon: single peripheral nerve block            Thiago Adrian MD   10/17/2023

## 2023-10-17 NOTE — OP NOTE
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 Item Serial No.  Lot No. LRB No. Used Action   DEVICE GRFT FIX FOR LIGMNT [facility] REP PEEK INT [facility] - [id number]  DEVICE GRFT FIX FOR LIGMNT [facility] REP PEEK INT [facility]- [id number] Right 1 Implanted   ANCHOR SUT NDL DX [employer] - [id number]  ANCHOR SUT NDL DX [facility]- [id number] Right 2 Implanted   STAPLE INT [id number] NIT [facility] - [id number]  STAPLE INT [id number] NIT COMPR DYNANITE  [employer] [facility]- [id number] Right 1 Implanted       Anesthesia:  Regional    Blood Loss:  minimal    Tourniquet:  Estimated calf 45 minutes    Pre Operative Abx:   Ancef 2g            Pre Operative Course:  Jez Pascual is a 62 y.o. 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 Operation In Detail:  Patient was evaluated in the preoperative area. We had a long discussion about the procedure and postoperative protocols. The patient was then brought back to the operating room suite and placed in the operating room table. A timeout was taken to identify the patient, procedure being performed, and laterality. After this the patient was prepped and draped in the normal sterile fashion using a Betadine solution and/or a ChloraPrep solution.   A timeout was then taken to identify the patient his name, date

## 2023-10-17 NOTE — ANESTHESIA POSTPROCEDURE EVALUATION
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     Brionna Phase I: Brionna Score: 8    Brionna Phase II: Brionna Score: 10      Anesthesia Post Evaluation    Patient location during evaluation: PACU  Patient participation: complete - patient participated  Level of consciousness: awake and awake and alert  Airway patency: patent  Nausea & Vomiting: no nausea  Complications: no  Cardiovascular status: hemodynamically stable  Respiratory status: acceptable  Hydration status: euvolemic  Multimodal analgesia pain management approach  Pain management: adequate

## 2023-10-17 NOTE — ANESTHESIA PROCEDURE NOTES
Peripheral Block    Patient location during procedure: pre-op  Reason for block: post-op pain management and at surgeon's request  Start time: 10/17/2023 8:55 AM  End time: 10/17/2023 8:57 AM  Staffing  Anesthesiologist: Poli Mann MD  Performed by: Poli Mann MD  Authorized by: Poli Mann MD    Preanesthetic Checklist  Completed: patient identified, IV checked, site marked, risks and benefits discussed, surgical/procedural consents, equipment checked, pre-op evaluation, timeout performed, anesthesia consent given, oxygen available and monitors applied/VS acknowledged  Peripheral Block   Patient position: prone  Prep: ChloraPrep  Provider prep: mask and sterile gloves  Patient monitoring: cardiac monitor, continuous pulse ox, frequent blood pressure checks, IV access, oxygen and responsive to questions  Block type: Femoral  Adductor canal  Laterality: right  Injection technique: single-shot  Guidance: ultrasound guided    Needle   Needle type: insulated echogenic nerve stimulator needle   Needle gauge: 22 G  Needle localization: anatomical landmarks and ultrasound guidance  Test dose: negative  Needle length: 4.   Assessment   Injection assessment: negative aspiration for heme, no paresthesia on injection and local visualized surrounding nerve on ultrasound  Slow fractionated injection: yes  Hemodynamics: stable  Real-time US image taken/store: yes  Outcomes: uncomplicated    Medications Administered  bupivacaine 0.5%-EPINEPHrine injection 1:[id number] - Perineural   20 mL - 10/17/2023 8:57:00 AM

## 2023-10-17 NOTE — DISCHARGE INSTRUCTIONS
sedentary lifestyle greatly increases your risk for illness    A healthy diet, regular physical exercise & weight monitoring are important for maintaining a healthy lifestyle    You may be retaining fluid if you have a history of heart failure or if you experience any of the following symptoms:  Weight gain of 3 pounds or more overnight or 5 pounds in a week, increased swelling in our hands or feet or shortness of breath while lying flat in bed. Please call your doctor as soon as you notice any of these symptoms; do not wait until your next office visit. Recognize signs and symptoms of STROKE:    F-face looks uneven    A-arms unable to move or move unevenly    S-speech slurred or non-existent    T-time-call 911 as soon as signs and symptoms begin-DO NOT go       Back to bed or wait to see if you get better-TIME IS BRAIN.

## 2023-10-17 NOTE — ANESTHESIA PROCEDURE NOTES
Peripheral Block    Patient location during procedure: pre-op  Reason for block: post-op pain management and at surgeon's request  Start time: 10/17/2023 8:52 AM  End time: 10/17/2023 8:54 AM  Staffing  Anesthesiologist: Poli Mann MD  Performed by: Poli Mann MD  Authorized by: Poli Mann MD    Preanesthetic Checklist  Completed: patient identified, IV checked, site marked, risks and benefits discussed, surgical/procedural consents, equipment checked, pre-op evaluation, timeout performed, anesthesia consent given, oxygen available and monitors applied/VS acknowledged  Peripheral Block   Patient position: prone  Prep: ChloraPrep  Provider prep: mask and sterile gloves  Patient monitoring: cardiac monitor, continuous pulse ox, frequent blood pressure checks, IV access, oxygen and responsive to questions  Block type: Sciatic  Popliteal  Laterality: right  Injection technique: single-shot  Guidance: ultrasound guided    Needle   Needle type: insulated echogenic nerve stimulator needle   Needle gauge: 22 G  Needle localization: anatomical landmarks and ultrasound guidance  Test dose: negative  Needle length: 4.   Assessment   Injection assessment: negative aspiration for heme, no paresthesia on injection and local visualized surrounding nerve on ultrasound  Slow fractionated injection: yes  Hemodynamics: stable  Real-time US image taken/store: yes  Outcomes: uncomplicated    Medications Administered  bupivacaine (MARCAINE) PF injection 0.5% - Perineural   10 mL - 10/17/2023 8:54:00 AM  dexamethasone (DECADRON) (PF) 10 mg/mL injection - Other   5 mg - 10/17/2023 8:54:00 AM  mepivacaine (CARBOCAINE) injection 1.5% - Perineural   20 mL - 10/17/2023 8:54:00 AM

## 2023-10-18 ENCOUNTER — TELEPHONE (OUTPATIENT)
Dept: ORTHOPEDIC SURGERY | Age: 58
End: 2023-10-18

## 2023-10-18 ENCOUNTER — CLINICAL DOCUMENTATION (OUTPATIENT)
Dept: ORTHOPEDIC SURGERY | Age: 58
End: 2023-10-18

## 2023-10-19 ENCOUNTER — TELEPHONE (OUTPATIENT)
Dept: ORTHOPEDIC SURGERY | Age: 58
End: 2023-10-19

## 2023-10-20 ENCOUNTER — CLINICAL DOCUMENTATION (OUTPATIENT)
Dept: ORTHOPEDIC SURGERY | Age: 58
End: 2023-10-20

## 2023-10-20 ENCOUNTER — TELEPHONE (OUTPATIENT)
Dept: ORTHOPEDIC SURGERY | Age: 58
End: 2023-10-20

## 2023-10-20 NOTE — TELEPHONE ENCOUNTER
Pt  called  back   and he  wants to come on Monday 30th at the  latest  time  possible    He  knows  [place]  be [place]   Please  call him or  message  him with the  exact  time.   Shana Antonio  has  been  corresponding with this patient

## 2023-10-30 ENCOUNTER — OFFICE VISIT (OUTPATIENT)
Dept: ORTHOPEDIC SURGERY | Age: 58
End: 2023-10-30

## 2023-10-30 DIAGNOSIS — M25.572 ACUTE BILATERAL ANKLE PAIN: ICD-10-CM

## 2023-10-30 DIAGNOSIS — M25.371 RIGHT ANKLE INSTABILITY: ICD-10-CM

## 2023-10-30 DIAGNOSIS — M25.571 ACUTE BILATERAL ANKLE PAIN: ICD-10-CM

## 2023-10-30 DIAGNOSIS — M76.71 PERONEAL TENDINITIS OF RIGHT LOWER EXTREMITY: Primary | ICD-10-CM

## 2023-10-30 PROCEDURE — 99024 POSTOP FOLLOW-UP VISIT: CPT | Performed by: NURSE PRACTITIONER

## 2023-10-30 NOTE — PROGRESS NOTES
The patient was prescribed a walker boot for the patient's right foot. The patient wears a size 10 shoe and I fitted them with a M size boot. The patient was fitted and instructed on the use of prescribed walker boot. I explained how to fit themselves and that the plastic flexible piece should always be on the front of the boot and secured by the Velcro straps on top. The air bladder in the boot was adjusted according to proper fit and comfort. The patient walked a short distance and acknowledged satisfaction with current fit. I also explained that they need a heel lift or a higher heeled shoe for the uninvolved LE to help normalize gait and avoid excessive low back stress/strain due to leg length inequality created from walker boot. The patient was also given an extra boot sock. Patient read and signed documenting they understand and agree to [facility]'s current DME return policy.

## 2023-11-20 ENCOUNTER — OFFICE VISIT (OUTPATIENT)
Dept: ORTHOPEDIC SURGERY | Age: 58
End: 2023-11-20

## 2023-11-20 DIAGNOSIS — M25.571 ACUTE BILATERAL ANKLE PAIN: ICD-10-CM

## 2023-11-20 DIAGNOSIS — M25.371 RIGHT ANKLE INSTABILITY: Primary | ICD-10-CM

## 2023-11-20 DIAGNOSIS — M25.572 ACUTE BILATERAL ANKLE PAIN: ICD-10-CM

## 2023-11-20 PROCEDURE — 99024 POSTOP FOLLOW-UP VISIT: CPT | Performed by: NURSE PRACTITIONER

## 2023-11-21 ENCOUNTER — HOSPITAL ENCOUNTER (OUTPATIENT)
Dept: PHYSICAL THERAPY | Age: 58
Setting detail: RECURRING SERIES
Discharge: HOME | End: 2023-11-24
Payer: COMMERCIAL

## 2023-11-21 DIAGNOSIS — M25.674 STIFFNESS OF RIGHT FOOT, NOT ELSEWHERE CLASSIFIED: Primary | ICD-10-CM

## 2023-11-21 DIAGNOSIS — M62.81 MUSCLE WEAKNESS (GENERALIZED): ICD-10-CM

## 2023-11-21 DIAGNOSIS — R26.89 OTHER ABNORMALITIES OF GAIT AND MOBILITY: ICD-10-CM

## 2023-11-21 PROCEDURE — 97162 PT EVAL MOD COMPLEX 30 MIN: CPT

## 2023-11-21 PROCEDURE — 97140 MANUAL THERAPY 1/> REGIONS: CPT

## 2023-11-21 ASSESSMENT — PAIN SCALES - GENERAL: PAINLEVEL_OUTOF10: 2

## 2023-11-21 NOTE — THERAPY EVALUATION
Prognosis:   Therapy Prognosis: Good     Initial Assessment Complexity:   Decision Making: Medium Complexity    PLAN   Effective Dates: 11/21/2023 TO Plan of Care/Certification Expiration Date: 02/19/24     Frequency/Duration: Plan Frequency: 2x a week for 90d ays     Interventions Planned (Treatment may consist of any combination of the following):    Current Treatment Recommendations: Strengthening; ROM; Balance training; Functional mobility training; Transfer training; ADL/Self-care training; Endurance training; Gait training; Stair training; Neuromuscular re-education; Manual; Pain management; Return to work related activity; Home exercise program; Safety education & training; Patient/Caregiver education & training; Modalities; Equipment evaluation, education, & procurement; Positioning; Dry needling; Aquatics     GOALS: (Goals have been discussed and agreed upon with patient.)  SHORT-TERM FUNCTIONAL GOALS: Time Frame: 4 weeks  Orkyleia Beecham. will be compliant with home exercise program within 4 weeks in order to improve active participation with management of patient's symptoms and/or functional deficits. Orysia Beecham. will report <=3/10 pain with walking >15 minutes in order to participate in daily exercise and daily activities. Orysia Beecham. will be able to stand >=15 minutes with <2/10 pain to feet in order to participate in household duties without issues/compromise. Orysia Beecham.  will improve Rt ankle dorsiflexion AROM from 0 degrees to +5 degrees in order to show improvement in ankle ROM and tolerance for functional activity. Orysia Beecham. will be report improved score on the LEFS from 11 to 30 to indicate improvement in functional independence.     DISCHARGE GOALS: Time Frame: 12 weeks  Orysia Beecham. will report <=2/10 pain with participation in activities of daily living and overall functional mobility including walking and stair

## 2023-11-21 NOTE — PROGRESS NOTES
Ning Mel. : 1965  Primary: [employer] Mu[employer]  Secondary:  [address] @ [place]  [address] [facility]  Phone: [phone]  Fax: [phone] Plan Frequency: 2x a week for 90d ays    Plan of Care/Certification Expiration Date: 24      >PT Visit Info:  Plan Frequency: 2x a week for 90d ays  Plan of Care/Certification Expiration Date: 24      Visit Count:  1    OUTPATIENT PHYSICAL THERAPY: Treatment Note 2023       Episode  }Appt Desk           **10/17/23: Right ankle arthroscopy with extensive debridement, Right peroneus brevis tendon secondary repair, Right lateral ankle reconstruction, Brostrom procedure, Right dorsiflexion first metatarsal osteotomy**  Treatment Diagnosis:    Stiffness of right foot, not elsewhere classified  Muscle weakness (generalized)  Other abnormalities of gait and mobility  Medical/Referring Diagnosis:    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 Jeanette protocol)  No data recorded   Interventions Planned (Treatment may consist of any combination of the following):    Current Treatment Recommendations: Strengthening; ROM; Balance training; Functional mobility training; Transfer training; ADL/Self-care training; Endurance training; Gait training; Stair training; Neuromuscular re-education; Manual; Pain management; Return to work related activity; Home exercise program; Safety education & training; Patient/Caregiver education & training; Modalities;  Equipment evaluation, education, & procurement; Positioning; Dry needling; Aquatics     >Subjective Comments: See eval     >Initial:

## 2023-11-28 ENCOUNTER — HOSPITAL ENCOUNTER (OUTPATIENT)
Dept: PHYSICAL THERAPY | Age: 58
Setting detail: RECURRING SERIES
Discharge: HOME | End: 2023-12-01
Payer: COMMERCIAL

## 2023-11-28 PROCEDURE — 97140 MANUAL THERAPY 1/> REGIONS: CPT

## 2023-11-28 PROCEDURE — 97110 THERAPEUTIC EXERCISES: CPT

## 2023-11-28 ASSESSMENT — PAIN SCALES - GENERAL: PAINLEVEL_OUTOF10: 3

## 2023-11-29 NOTE — PROGRESS NOTES
Shen Farfan. : 1965  Primary: Jordan Ready [employer])  Secondary:  [address] [facility]  [facility]  Phone: [phone]  Fax: [phone] Plan Frequency: 2x a week for 90d ays    Plan of Care/Certification Expiration Date: 24      >PT Visit Info:  Plan Frequency: 2x a week for 90d ays  Plan of Care/Certification Expiration Date: 24      Visit Count:  2    OUTPATIENT PHYSICAL THERAPY: Treatment Note 2023       Episode  }Appt Desk           **10/17/23: Right ankle arthroscopy with extensive debridement, Right peroneus brevis tendon secondary repair, Right lateral ankle reconstruction, Brostrom procedure, Right dorsiflexion first metatarsal osteotomy**  Treatment Diagnosis:    Stiffness of right foot, not elsewhere classified  Muscle weakness (generalized)  Other abnormalities of gait and mobility  Medical/Referring Diagnosis:    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]  Peroneal tendinitis of right lower extremity [M76.71]  Referring Physician:  Betty Spence, APRN - CNP  MD Orders:  PT Eval and Treat   Date of Onset:  Onset Date: 10/17/23     Allergies:   Iodine and Prednisone  Restrictions/Precautions:  Restrictions/Precautions: Other (comment) (Follow Bromstrom Procedure Protocol)  No data recorded   Interventions Planned (Treatment may consist of any combination of the following):    Current Treatment Recommendations: Strengthening; ROM; Balance training; Functional mobility training; Transfer training; ADL/Self-care training; Endurance training; Gait training; Stair training; Neuromuscular re-education; Manual; Pain management; Return to work related activity; Home exercise program; Safety education & training; Patient/Caregiver education & training; Modalities;  Equipment evaluation, education, & procurement; Positioning; Dry needling; Aquatics     >Subjective Comments: Pt notes he tried wearing a

## 2023-11-30 ENCOUNTER — HOSPITAL ENCOUNTER (OUTPATIENT)
Dept: PHYSICAL THERAPY | Age: 58
Setting detail: RECURRING SERIES
End: 2023-11-30
Payer: COMMERCIAL

## 2023-11-30 PROCEDURE — 97140 MANUAL THERAPY 1/> REGIONS: CPT

## 2023-11-30 ASSESSMENT — PAIN SCALES - GENERAL: PAINLEVEL_OUTOF10: 1

## 2023-11-30 NOTE — PROGRESS NOTES
seconds, Lb- pounds, min - minutes, HA- Headache, OP- Over pressure, tband- theraband, fwd/bwd- Forward/Backward, TA- Transversus Abdominus, dbl- Double      TREATMENT/SESSION SUMMARY:     Response to Treatment: Pt with slightly increased swelling in foot today and continued pitting along dorsum. Decreased ankle DF due to gastroc mm restrictions; improved following manual interventions. Continued decreased subtalar mobility. Communication/Consultation:  HEP review  Equipment provided today:  None  Recommendations/Intent for next treatment session: Next visit will focus on progressing strength and ROM.      Total Treatment Billable Duration:  25 minutes  Time In: 1030  Time Out: [address]  }Post Session Pain  PT Visit 25 [facility] Portal  MD Guidelines  Scanned Media  Benefits  MyChart    Future Appointments   Date Time Provider [address]   12/12/2023 10:30 AM Gracie Rosalinda, PT [facility]   12/14/2023  9:30 AM Gracie Rosalinda, PT [facility]   12/18/2023  2:40 PM Antelmo Marquez, APRN - CNP [facility]   12/19/2023  9:30 AM Gracie Rosalinda, PT [facility]   12/21/2023  9:30 AM Gracie Rosalinda, PT [facility]   12/27/2023  9:30 AM Gracie Rosalinda, PT [facility]   12/29/2023  9:30 AM Gracie Rosalinda, PT [facility]   4/11/2024  2:30 PM Maryann Jones MD [id number] [facility]

## 2023-12-12 ENCOUNTER — HOSPITAL ENCOUNTER (OUTPATIENT)
Dept: PHYSICAL THERAPY | Age: 58
Setting detail: RECURRING SERIES
Discharge: HOME | End: 2023-12-15
Payer: COMMERCIAL

## 2023-12-12 PROCEDURE — 97140 MANUAL THERAPY 1/> REGIONS: CPT

## 2023-12-12 PROCEDURE — 97110 THERAPEUTIC EXERCISES: CPT

## 2023-12-12 ASSESSMENT — PAIN SCALES - GENERAL: PAINLEVEL_OUTOF10: 1

## 2023-12-12 NOTE — PROGRESS NOTES
while on vacation which caused swelling in ankle/foot. States it has since decreased significantly especially after wearing compression sock. Notes most pain in ankle/foot is coming from the boot. >Initial:     1/10>Post Session:       1/10  Medications Last Reviewed:  12/12/2023  Updated Objective Findings: None today  Treatment   THERAPEUTIC EXERCISE: (38 minutes):  Exercises per grid below to improve mobility and strength. Required moderate visual, verbal, manual and tactile cues to promote proper body alignment, promote proper body posture and promote proper body mechanics. Progressed resistance, range, repetitions and complexity of movement as indicated. Date:  12/12/2023   Activity/Exercise Parameters   Anatomy, HEP review with areas of focus    Ankle PROM DF/PF/IV/EV 5min   Nustep for reciprocal UE/LE proprioception training 6min  Level 3   *MAT*   SLR hip flexion ER bias    SLR abd ER bias    Sidelying clam shell    Sitting hamstring stretch    Sitting towel scrunch 2x10 Rt only   Sitting towel inversion/eversion 2x10 Rt only   Sitting resisted ankle inv/ev/DF 2x10 Rt only  Red theraband   *STANDING*   Forward backward weight shifts 2x10  In shoe   Lateral weight shifts 2x10  In shoe   Heel toe weight shifts 2x10  In shoe   Slow marching 2x10  In shoe       [email or web address] Portal   Access Code: [email or web address]  URL: [email or web address]  Date: 11/21/2023  Prepared by: Mary Woodard    MANUAL THERAPY: (15 minutes): Joint mobilization, Soft tissue mobilization and Manipulation was utilized and necessary because of the patient's restricted joint motion, painful spasm, loss of articular motion and restricted motion of soft tissue.   -Supine and prone STM Rt ankle/foot complex, peroneal mm, gastroc, tibialis anterior  -Supine Rt ankle PROM with skilled joint mobilization  -Supine Rt subtalar and talocrural mobilizations    Commonly used abbreviations that may be included in this note:  STM- Soft

## 2023-12-13 ENCOUNTER — TELEPHONE (OUTPATIENT)
Dept: ORTHOPEDIC SURGERY | Age: 58
End: 2023-12-13

## 2023-12-14 NOTE — TELEPHONE ENCOUNTER
He needs to talk to Virgil Gilliam about the form he sent via [employer], he said she was going to fax them yesterday but he didn't know where she was faxing but he needs to come  a copy today to turn in so he can RTW Monday.
Patient picked up forms from [facility].
home

## 2023-12-21 ENCOUNTER — HOSPITAL ENCOUNTER (OUTPATIENT)
Dept: PHYSICAL THERAPY | Age: 58
Setting detail: RECURRING SERIES
Discharge: HOME | End: 2023-12-24
Payer: COMMERCIAL

## 2023-12-21 PROCEDURE — 97140 MANUAL THERAPY 1/> REGIONS: CPT

## 2023-12-21 PROCEDURE — 97110 THERAPEUTIC EXERCISES: CPT

## 2023-12-21 NOTE — PROGRESS NOTES
Mehreen Dumont. : 1965  Primary: Lexis Mckeon [employer])  Secondary:  [address] [facility]  [address] [facility]  Phone: [phone]  Fax: [phone] Plan Frequency: 2x a week for 90d ays    Plan of Care/Certification Expiration Date: 24      >PT Visit Info:  Plan Frequency: 2x a week for 90d ays  Plan of Care/Certification Expiration Date: 24      Visit Count:  7    OUTPATIENT PHYSICAL THERAPY: Treatment Note 2023       Episode  }Appt Desk           **10/17/23: Right ankle arthroscopy with extensive debridement, Right peroneus brevis tendon secondary repair, Right lateral ankle reconstruction, Brostrom procedure, Right dorsiflexion first metatarsal osteotomy**  Treatment Diagnosis:    Stiffness of right foot, not elsewhere classified  Muscle weakness (generalized)  Other abnormalities of gait and mobility  Medical/Referring Diagnosis:    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]  Peroneal tendinitis of right lower extremity [M76.71]  Referring Physician:  [place], APRN - CNP  MD Orders:  PT Eval and Treat   Date of Onset:  Onset Date: 10/17/23     Allergies:   Iodine and Prednisone  Restrictions/Precautions:  Restrictions/Precautions: Other (comment) (Follow Bromstrom Procedure Protocol)  No data recorded   Interventions Planned (Treatment may consist of any combination of the following):    Current Treatment Recommendations: Strengthening; ROM; Balance training; Functional mobility training; Transfer training; ADL/Self-care training; Endurance training; Gait training; Stair training; Neuromuscular re-education; Manual; Pain management; Return to work related activity; Home exercise program; Safety education & training; Patient/Caregiver education & training; Modalities;  Equipment evaluation, education, & procurement; Positioning; Dry needling; Aquatics     >Subjective Comments: Pt notes he worked 8 hour days the

## 2023-12-27 ENCOUNTER — HOSPITAL ENCOUNTER (OUTPATIENT)
Dept: PHYSICAL THERAPY | Age: 58
Setting detail: RECURRING SERIES
End: 2023-12-27
Payer: COMMERCIAL

## 2023-12-28 NOTE — PROGRESS NOTES
Ricardo Shaffer Jr.  : 1965  Primary: [employer]  Secondary:  [facility] @ [place] [address] [address] [facility]  [facility] [address]  Phone: [phone]  Fax: [phone]       2023      Patient cancelled by provider.      Gilma Herron, PT

## 2023-12-29 ENCOUNTER — APPOINTMENT (OUTPATIENT)
Dept: PHYSICAL THERAPY | Age: 58
End: 2023-12-29
Payer: COMMERCIAL

## 2024-01-02 ENCOUNTER — HOSPITAL ENCOUNTER (OUTPATIENT)
Dept: PHYSICAL THERAPY | Age: 59
Setting detail: RECURRING SERIES
Discharge: HOME | End: 2024-01-05
Payer: COMMERCIAL

## 2024-01-02 PROCEDURE — 97140 MANUAL THERAPY 1/> REGIONS: CPT

## 2024-01-02 PROCEDURE — 97110 THERAPEUTIC EXERCISES: CPT

## 2024-01-02 ASSESSMENT — PAIN SCALES - GENERAL: PAINLEVEL_OUTOF10: 1

## 2024-01-02 NOTE — PROGRESS NOTES
Ricardo Shaffer Jr.  : 1965  Primary: [employer]  Secondary:  [facility] @ [place] [address] [address] [place] [address]  Phone: [phone]  Fax: [phone] Plan Frequency: 2x a week for 90d ays    Plan of Care/Certification Expiration Date: 24      >PT Visit Info:  Plan Frequency: 2x a week for 90d ays  Plan of Care/Certification Expiration Date: 24      Visit Count:  8    OUTPATIENT PHYSICAL THERAPY: Treatment Note 2024       Episode  }Appt Desk           **10/17/23: Right ankle arthroscopy with extensive debridement, Right peroneus brevis tendon secondary repair, Right lateral ankle reconstruction, Brostrom procedure, Right dorsiflexion first metatarsal osteotomy**  Treatment Diagnosis:    Stiffness of right foot, not elsewhere classified  Muscle weakness (generalized)  Other abnormalities of gait and mobility  Medical/Referring Diagnosis:    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]  Peroneal tendinitis of right lower extremity [M76.71]  Referring Physician:  Casandra Rossi, NEO - CNP  MD Orders:  PT Eval and Treat   Date of Onset:  Onset Date: 10/17/23     Allergies:   Iodine and Prednisone  Restrictions/Precautions:  Restrictions/Precautions: Other (comment) (Follow Bromstrom Procedure Protocol)  No data recorded   Interventions Planned (Treatment may consist of any combination of the following):    Current Treatment Recommendations: Strengthening; ROM; Balance training; Functional mobility training; Transfer training; ADL/Self-care training; Endurance training; Gait training; Stair training; Neuromuscular re-education; Manual; Pain management; Return to work related activity; Home exercise program; Safety education & training; Patient/Caregiver education & training; Modalities; Equipment evaluation, education, & procurement; Positioning; Dry needling; Aquatics     >Subjective Comments: Pt reports he continues to have pain

## 2024-01-04 ENCOUNTER — HOSPITAL ENCOUNTER (OUTPATIENT)
Dept: PHYSICAL THERAPY | Age: 59
Setting detail: RECURRING SERIES
Discharge: HOME | End: 2024-01-07
Payer: COMMERCIAL

## 2024-01-04 PROCEDURE — 97110 THERAPEUTIC EXERCISES: CPT

## 2024-01-04 PROCEDURE — 97140 MANUAL THERAPY 1/> REGIONS: CPT

## 2024-01-04 ASSESSMENT — PAIN SCALES - GENERAL: PAINLEVEL_OUTOF10: 3

## 2024-01-04 NOTE — PROGRESS NOTES
Ricardo Shaffer Jr.  : 1965  Primary: [employer] ([employer])  Secondary:  [facility] @ [place] [address] [place] [address]  Phone: [phone]  Fax: [phone] Plan Frequency: 2x a week for 90d ays    Plan of Care/Certification Expiration Date: 24      >PT Visit Info:  Plan Frequency: 2x a week for 90d ays  Plan of Care/Certification Expiration Date: 24      Visit Count:  9    OUTPATIENT PHYSICAL THERAPY: Treatment Note 2024       Episode  }Appt Desk           **10/17/23: Right ankle arthroscopy with extensive debridement, Right peroneus brevis tendon secondary repair, Right lateral ankle reconstruction, Brostrom procedure, Right dorsiflexion first metatarsal osteotomy**  Treatment Diagnosis:    Stiffness of right foot, not elsewhere classified  Muscle weakness (generalized)  Other abnormalities of gait and mobility  Medical/Referring Diagnosis:    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]  Peroneal tendinitis of right lower extremity [M76.71]  Referring Physician:  Casandra Rossi, NEO - CNP  MD Orders:  PT Eval and Treat   Date of Onset:  Onset Date: 10/17/23     Allergies:   Iodine and Prednisone  Restrictions/Precautions:  Restrictions/Precautions: Other (comment) (Follow Bromstrom Procedure Protocol)  No data recorded   Interventions Planned (Treatment may consist of any combination of the following):    Current Treatment Recommendations: Strengthening; ROM; Balance training; Functional mobility training; Transfer training; ADL/Self-care training; Endurance training; Gait training; Stair training; Neuromuscular re-education; Manual; Pain management; Return to work related activity; Home exercise program; Safety education & training; Patient/Caregiver education & training; Modalities; Equipment evaluation, education, & procurement; Positioning; Dry needling; Aquatics     >Subjective Comments: Pt reports he had significant pain

## 2024-01-09 ENCOUNTER — HOSPITAL ENCOUNTER (OUTPATIENT)
Dept: PHYSICAL THERAPY | Age: 59
Setting detail: RECURRING SERIES
Discharge: HOME | End: 2024-01-12
Payer: COMMERCIAL

## 2024-01-09 PROCEDURE — 97140 MANUAL THERAPY 1/> REGIONS: CPT

## 2024-01-09 PROCEDURE — 97110 THERAPEUTIC EXERCISES: CPT

## 2024-01-09 ASSESSMENT — PAIN SCALES - GENERAL: PAINLEVEL_OUTOF10: 3

## 2024-01-09 NOTE — PROGRESS NOTES
Decreased ADL status; Decreased tolerance to work activity; Decreased strength; Decreased body mechanics; Decreased ROM; Decreased endurance; Decreased sensation; Decreased balance; Increased pain; Decreased posture     Therapy Prognosis:   Therapy Prognosis: Good     Initial Assessment Complexity:   Decision Making: Medium Complexity    PLAN   Effective Dates: 11/21/2023 TO Plan of Care/Certification Expiration Date: 02/19/24     Frequency/Duration: Plan Frequency: 2x a week for 90d ays     Interventions Planned (Treatment may consist of any combination of the following):    Current Treatment Recommendations: Strengthening; ROM; Balance training; Functional mobility training; Transfer training; ADL/Self-care training; Endurance training; Gait training; Stair training; Neuromuscular re-education; Manual; Pain management; Return to work related activity; Home exercise program; Safety education & training; Patient/Caregiver education & training; Modalities; Equipment evaluation, education, & procurement; Positioning; Dry needling; Aquatics     GOALS: (Goals have been discussed and agreed upon with patient.)  SHORT-TERM FUNCTIONAL GOALS: Time Frame: 4 weeks  Ricardo Shaffer Jr. will be compliant with home exercise program within 4 weeks in order to improve active participation with management of patient's symptoms and/or functional deficits. -MET 1/9/2024  Ricardo Shaffer Jr. will report <=3/10 pain with walking >15 minutes in order to participate in daily exercise and daily activities. -PROGRESSING 1/9/2024  Ricardo Shaffer Jr. will be able to stand >=15 minutes with <2/10 pain to feet in order to participate in household duties without issues/compromise.-PROGRESSING 1/9/2024  Ricardo Shaffer Jr.  will improve Rt ankle dorsiflexion AROM from 0 degrees to +5 degrees in order to show improvement in ankle ROM and tolerance for functional activity. -PROGRESSING 1/9/2024  Ricardo Shaffer Jr. will

## 2024-01-09 NOTE — PROGRESS NOTES
was utilized and necessary because of the patient's restricted joint motion, painful spasm, loss of articular motion and restricted motion of soft tissue.   -Supine and prone STM Rt ankle/foot complex, peroneal mm, gastroc, tibialis anterior  -Supine Rt ankle PROM with skilled joint mobilization  -Supine Rt great toe, subtalar, and talocrural mobilizations  -Prone STM gastroc, PF, and achilles    Commonly used abbreviations that may be included in this note:  STM- Soft tissue mobilization, R>L or L>R- right greater than left or vice versa, HEP - Home exercise program, CPA/UPA - Central or unilateral posterior-anterior mobilization, SLS- single leg stance, SKTC - Single leg to chest, SNAGS/NAGS- (sustained) Natural apophyseal glides, TKE- Terminal knee extension, ER- External rotation, IR- Internal rotation, B - Bilateral, sec- seconds, Lb- pounds, min - minutes, HA- Headache, OP- Over pressure, tband- theraband, fwd/bwd- Forward/Backward, TA- Transversus Abdominus, dbl- Double      TREATMENT/SESSION SUMMARY:     Response to Treatment: See PN for objective measures.   Communication/Consultation:  HEP review  Equipment provided today:  Red theraband  Recommendations/Intent for next treatment session: Next visit will focus on progressing strength and ROM.     Total Treatment Billable Duration:  55 minutes  Time In: 0830  Time Out: 0930    Gilma Herron, PT       Charge Capture  }Post Session Pain  PT Visit Info  [facility] Portal  MD Guidelines  Scanned Media  Benefits  MyChart    Future Appointments   Date Time Provider Department Center   1/11/2024  8:30 AM Gilma Herron, PT [facility]   1/16/2024  8:30 AM Gilma Herron, PT [facility]   1/18/2024  8:30 AM Gilma Herron, PT [facility]   1/23/2024  8:30 AM Gilma Herron, PT [facility]   1/25/2024  9:30 AM Gilma Herron, PT [facility]   1/30/2024  8:30 AM Gilma Herron, PT [facility]   2/1/2024  9:30 AM Gilma Herron, PT [facility]

## 2024-01-11 ENCOUNTER — HOSPITAL ENCOUNTER (OUTPATIENT)
Dept: PHYSICAL THERAPY | Age: 59
Setting detail: RECURRING SERIES
Discharge: HOME | End: 2024-01-14
Payer: COMMERCIAL

## 2024-01-11 PROCEDURE — 97112 NEUROMUSCULAR REEDUCATION: CPT

## 2024-01-11 PROCEDURE — 97110 THERAPEUTIC EXERCISES: CPT

## 2024-01-11 PROCEDURE — 97140 MANUAL THERAPY 1/> REGIONS: CPT

## 2024-01-11 ASSESSMENT — PAIN SCALES - GENERAL: PAINLEVEL_OUTOF10: 3

## 2024-01-11 NOTE — PROGRESS NOTES
he is able to rest over the weekend and wear comfortable shoes that do not put pressure on his great toe.      >Initial:     3/10>Post Session:       2/10  Medications Last Reviewed:  1/11/2024  Updated Objective Findings: None today  Treatment   THERAPEUTIC EXERCISE: (23 minutes):  Exercises per grid below to improve mobility and strength.  Required moderate visual, verbal, manual and tactile cues to promote proper body alignment, promote proper body posture and promote proper body mechanics.  Progressed resistance, range, repetitions and complexity of movement as indicated.      Date:  1/11/2024   Activity/Exercise Parameters       Anatomy, HEP review with areas of focus 5min   KT tape application and education    Ankle PROM DF/PF/IV/EV 5min   Nustep for reciprocal UE/LE proprioception training 6min  Level 3   *MAT*   Resisted ankle DF, PF, IV, EV 2x10  Blue theraband   Cable step outs 1x8 B  15#   SLR hip flexion ER bias    SLR abd ER bias    Sidelying clam shell    Sitting hamstring stretch    Sitting towel scrunch    Sitting towel inversion/eversion    Seated heel raises for great toe stretch    Sitting resisted ankle inv/ev/DF    *STANDING*   Ambulation    Wobble board DF/PF and IV/EV    Heel toe raises    Marching on foam pad    Hip abd balance on foam pad    Shuttle gastroc negatives    Shuttle SL squats    Tandem balance on foam pad with vertical movements holding weighted ball    Rhomberg balance with arms crossed on foam pad    Knee to wall DF stretch with PT assist with theraband for talocrural mobilization concurrently    Keegan step overs    Marching walks    Gastroc stretch    Lateral walking    Heel toe weight shifts    Slow marching        [facility] Portal   Access Code: [email or web address]  URL: [email or web address]  Date: 11/21/2023  Prepared by: Gilma Herron    MANUAL THERAPY: (15 minutes): Joint mobilization, Soft tissue mobilization and Manipulation was utilized and necessary because of

## 2024-01-16 ENCOUNTER — HOSPITAL ENCOUNTER (OUTPATIENT)
Dept: PHYSICAL THERAPY | Age: 59
Setting detail: RECURRING SERIES
Discharge: HOME | End: 2024-01-19
Payer: COMMERCIAL

## 2024-01-16 PROCEDURE — 97110 THERAPEUTIC EXERCISES: CPT

## 2024-01-16 PROCEDURE — 97140 MANUAL THERAPY 1/> REGIONS: CPT

## 2024-01-16 ASSESSMENT — PAIN SCALES - GENERAL: PAINLEVEL_OUTOF10: 3

## 2024-01-16 NOTE — PROGRESS NOTES
Ricardo Shaffer Jr.  : 1965  Primary: [employer] ([employer])  Secondary:  [facility] @ [place] [address] [place] [place] [address]  Phone: [phone]  Fax: [phone] Plan Frequency: 2x a week for 90d ays    Plan of Care/Certification Expiration Date: 24      >PT Visit Info:  Plan Frequency: 2x a week for 90d ays  Plan of Care/Certification Expiration Date: 24      Visit Count:  12    OUTPATIENT PHYSICAL THERAPY: Treatment Note 2024       Episode  }Appt Desk           **10/17/23: Right ankle arthroscopy with extensive debridement, Right peroneus brevis tendon secondary repair, Right lateral ankle reconstruction, Brostrom procedure, Right dorsiflexion first metatarsal osteotomy**  Treatment Diagnosis:    Stiffness of right foot, not elsewhere classified  Muscle weakness (generalized)  Other abnormalities of gait and mobility  Medical/Referring Diagnosis:    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]  Peroneal tendinitis of right lower extremity [M76.71]  Referring Physician:  Casandra Rossi, APRN - CNP  MD Orders:  PT Eval and Treat   Date of Onset:  Onset Date: 10/17/23     Allergies:   Iodine and Prednisone  Restrictions/Precautions:  Restrictions/Precautions: Other (comment) (Follow Bromstrom Procedure Protocol)  No data recorded   Interventions Planned (Treatment may consist of any combination of the following):    Current Treatment Recommendations: Strengthening; ROM; Balance training; Functional mobility training; Transfer training; ADL/Self-care training; Endurance training; Gait training; Stair training; Neuromuscular re-education; Manual; Pain management; Return to work related activity; Home exercise program; Safety education & training; Patient/Caregiver education & training; Modalities; Equipment evaluation, education, & procurement; Positioning; Dry needling; Aquatics     >Subjective Comments: Pt notes his foot and toe did pretty

## 2024-01-18 ENCOUNTER — HOSPITAL ENCOUNTER (OUTPATIENT)
Dept: PHYSICAL THERAPY | Age: 59
Setting detail: RECURRING SERIES
Discharge: HOME | End: 2024-01-21
Payer: COMMERCIAL

## 2024-01-18 PROCEDURE — 97110 THERAPEUTIC EXERCISES: CPT

## 2024-01-18 PROCEDURE — 97140 MANUAL THERAPY 1/> REGIONS: CPT

## 2024-01-18 ASSESSMENT — PAIN SCALES - GENERAL: PAINLEVEL_OUTOF10: 2

## 2024-01-18 NOTE — PROGRESS NOTES
Gopal    MANUAL THERAPY: (25 minutes): Joint mobilization, Soft tissue mobilization and Manipulation was utilized and necessary because of the patient's restricted joint motion, painful spasm, loss of articular motion and restricted motion of soft tissue.   -Supine and prone STM Rt ankle/foot complex, peroneal mm, gastroc, tibialis anterior  -Supine Rt ankle PROM with skilled joint mobilization  -Supine Rt great toe, subtalar, and talocrural mobilizations  -Prone STM gastroc, PF, and achilles    Commonly used abbreviations that may be included in this note:  STM- Soft tissue mobilization, R>L or L>R- right greater than left or vice versa, HEP - Home exercise program, CPA/UPA - Central or unilateral posterior-anterior mobilization, SLS- single leg stance, SKTC - Single leg to chest, SNAGS/NAGS- (sustained) Natural apophyseal glides, TKE- Terminal knee extension, ER- External rotation, IR- Internal rotation, B - Bilateral, sec- seconds, Lb- pounds, min - minutes, HA- Headache, OP- Over pressure, tband- theraband, fwd/bwd- Forward/Backward, TA- Transversus Abdominus, dbl- Double     NEUROMUSCULAR RE-EDUCATION: (0 minutes):    Exercise/activities per grid below to improve balance and proprioception.  Required minimal visual, verbal, and manual cues to promote dynamic balance in standing.   Date:  1/18/2024    Activity/Exercise Parameters   SLS on foam pad 3x30s B   Slow march holds on foam pad 2x12 B  Hold 3s   Heel toe raises without UE support 1x20   Step up onto foam pad 2x10  6in step                   TREATMENT/SESSION SUMMARY:     Response to Treatment: Pt with improvements in great toe mobility. Slightly decreased balance during marching.   Communication/Consultation:  HEP review  Equipment provided today:  Red theraband  Recommendations/Intent for next treatment session: Next visit will focus on progressing strength and ROM.     Total Treatment Billable Duration:  55 minutes  Time In: 0830  Time Out:

## 2024-01-23 ENCOUNTER — HOSPITAL ENCOUNTER (OUTPATIENT)
Dept: PHYSICAL THERAPY | Age: 59
Setting detail: RECURRING SERIES
Discharge: HOME | End: 2024-01-26
Payer: COMMERCIAL

## 2024-01-23 PROCEDURE — 97140 MANUAL THERAPY 1/> REGIONS: CPT

## 2024-01-23 PROCEDURE — 97110 THERAPEUTIC EXERCISES: CPT

## 2024-01-23 ASSESSMENT — PAIN SCALES - GENERAL: PAINLEVEL_OUTOF10: 2

## 2024-01-23 NOTE — PROGRESS NOTES
Ricardo Shaffer Jr.  : 1965  Primary: [employer] ([employer])  Secondary:  [facility] @ [place] [address] [address] [place] [address]  Phone: [phone]  Fax: [phone] Plan Frequency: 2x a week for 90d ays    Plan of Care/Certification Expiration Date: 24      >PT Visit Info:  Plan Frequency: 2x a week for 90d ays  Plan of Care/Certification Expiration Date: 24      Visit Count:  14    OUTPATIENT PHYSICAL THERAPY: Treatment Note 2024       Episode  }Appt Desk           **10/17/23: Right ankle arthroscopy with extensive debridement, Right peroneus brevis tendon secondary repair, Right lateral ankle reconstruction, Brostrom procedure, Right dorsiflexion first metatarsal osteotomy**  Treatment Diagnosis:    Stiffness of right foot, not elsewhere classified  Muscle weakness (generalized)  Other abnormalities of gait and mobility  Medical/Referring Diagnosis:    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]  Peroneal tendinitis of right lower extremity [M76.71]  Referring Physician:  Casandra Rossi, APRN - CNP  MD Orders:  PT Eval and Treat   Date of Onset:  Onset Date: 10/17/23     Allergies:   Iodine and Prednisone  Restrictions/Precautions:  Restrictions/Precautions: Other (comment) (Follow Bromstrom Procedure Protocol)  No data recorded   Interventions Planned (Treatment may consist of any combination of the following):    Current Treatment Recommendations: Strengthening; ROM; Balance training; Functional mobility training; Transfer training; ADL/Self-care training; Endurance training; Gait training; Stair training; Neuromuscular re-education; Manual; Pain management; Return to work related activity; Home exercise program; Safety education & training; Patient/Caregiver education & training; Modalities; Equipment evaluation, education, & procurement; Positioning; Dry needling; Aquatics     >Subjective Comments: Pt notes he walked around his

## 2024-01-25 ENCOUNTER — HOSPITAL ENCOUNTER (OUTPATIENT)
Dept: PHYSICAL THERAPY | Age: 59
Setting detail: RECURRING SERIES
Discharge: HOME | End: 2024-01-28
Payer: COMMERCIAL

## 2024-01-25 PROCEDURE — 97110 THERAPEUTIC EXERCISES: CPT

## 2024-01-25 PROCEDURE — 97140 MANUAL THERAPY 1/> REGIONS: CPT

## 2024-01-25 ASSESSMENT — PAIN SCALES - GENERAL: PAINLEVEL_OUTOF10: 2

## 2024-01-25 NOTE — PROGRESS NOTES
THERAPY: (25 minutes): Joint mobilization, Soft tissue mobilization and Manipulation was utilized and necessary because of the patient's restricted joint motion, painful spasm, loss of articular motion and restricted motion of soft tissue.   -Supine and prone STM Rt ankle/foot complex, peroneal mm, gastroc, tibialis anterior  -Supine Rt ankle PROM with skilled joint mobilization  -Supine Rt great toe, subtalar, and talocrural mobilizations  -Prone STM gastroc, PF, and achilles    Commonly used abbreviations that may be included in this note:  STM- Soft tissue mobilization, R>L or L>R- right greater than left or vice versa, HEP - Home exercise program, CPA/UPA - Central or unilateral posterior-anterior mobilization, SLS- single leg stance, SKTC - Single leg to chest, SNAGS/NAGS- (sustained) Natural apophyseal glides, TKE- Terminal knee extension, ER- External rotation, IR- Internal rotation, B - Bilateral, sec- seconds, Lb- pounds, min - minutes, HA- Headache, OP- Over pressure, tband- theraband, fwd/bwd- Forward/Backward, TA- Transversus Abdominus, dbl- Double     NEUROMUSCULAR RE-EDUCATION: (0 minutes):    Exercise/activities per grid below to improve balance and proprioception.  Required minimal visual, verbal, and manual cues to promote dynamic balance in standing.   Date:  1/25/2024    Activity/Exercise Parameters   SLS on foam pad 3x30s B   Slow march holds on foam pad 2x12 B  Hold 3s   Heel toe raises without UE support 1x20   Step up onto foam pad 2x10  6in step                   TREATMENT/SESSION SUMMARY:     Response to Treatment: Pt with improvements in balance during marching.   Communication/Consultation:  HEP review  Equipment provided today:  Red theraband  Recommendations/Intent for next treatment session: Next visit will focus on progressing strength and ROM.     Total Treatment Billable Duration:  55 minutes  Time In: 0925  Time Out: 1025    Gilma Herron, PT       Charge Capture  }Post Session

## 2024-01-30 ENCOUNTER — HOSPITAL ENCOUNTER (OUTPATIENT)
Dept: PHYSICAL THERAPY | Age: 59
Setting detail: RECURRING SERIES
Discharge: HOME | End: 2024-02-02
Payer: COMMERCIAL

## 2024-01-30 PROCEDURE — 97110 THERAPEUTIC EXERCISES: CPT

## 2024-01-30 PROCEDURE — 97140 MANUAL THERAPY 1/> REGIONS: CPT

## 2024-01-30 ASSESSMENT — PAIN SCALES - GENERAL: PAINLEVEL_OUTOF10: 2

## 2024-01-30 NOTE — PROGRESS NOTES
Ricardo Shaffer Jr.  : 1965  Primary: [employer] ([employer])  Secondary:  [facility] @ [place] [address] [place] [address]  Phone: [phone]  Fax: [phone] Plan Frequency: 2x a week for 90d ays    Plan of Care/Certification Expiration Date: 24      >PT Visit Info:  Plan Frequency: 2x a week for 90d ays  Plan of Care/Certification Expiration Date: 24      Visit Count:  16    OUTPATIENT PHYSICAL THERAPY: Treatment Note 2024       Episode  }Appt Desk           **10/17/23: Right ankle arthroscopy with extensive debridement, Right peroneus brevis tendon secondary repair, Right lateral ankle reconstruction, Brostrom procedure, Right dorsiflexion first metatarsal osteotomy**  Treatment Diagnosis:    Stiffness of right foot, not elsewhere classified  Muscle weakness (generalized)  Other abnormalities of gait and mobility  Medical/Referring Diagnosis:    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]  Peroneal tendinitis of right lower extremity [M76.71]  Referring Physician:  Casandra Rossi, NEO - CNP  MD Orders:  PT Eval and Treat   Date of Onset:  Onset Date: 10/17/23     Allergies:   Iodine and Prednisone  Restrictions/Precautions:  Restrictions/Precautions: Other (comment) (Follow Bromstrom Procedure Protocol)  No data recorded   Interventions Planned (Treatment may consist of any combination of the following):    Current Treatment Recommendations: Strengthening; ROM; Balance training; Functional mobility training; Transfer training; ADL/Self-care training; Endurance training; Gait training; Stair training; Neuromuscular re-education; Manual; Pain management; Return to work related activity; Home exercise program; Safety education & training; Patient/Caregiver education & training; Modalities; Equipment evaluation, education, & procurement; Positioning; Dry needling; Aquatics     >Subjective Comments: Pt denies changes in status today.

## 2024-02-01 ENCOUNTER — HOSPITAL ENCOUNTER (OUTPATIENT)
Dept: PHYSICAL THERAPY | Age: 59
Setting detail: RECURRING SERIES
Discharge: HOME | End: 2024-02-04
Payer: COMMERCIAL

## 2024-02-01 PROCEDURE — 97140 MANUAL THERAPY 1/> REGIONS: CPT

## 2024-02-01 PROCEDURE — 97110 THERAPEUTIC EXERCISES: CPT

## 2024-02-01 ASSESSMENT — PAIN SCALES - GENERAL: PAINLEVEL_OUTOF10: 4

## 2024-02-01 NOTE — PROGRESS NOTES
Ricardo Shaffer Jr.  : 1965  Primary: [employer] ([employer])  Secondary:  [facility] @ [place] [address] [place] [address]  Phone: [phone]  Fax: [phone] Plan Frequency: 2x a week for 90d ays    Plan of Care/Certification Expiration Date: 24      >PT Visit Info:  Plan Frequency: 2x a week for 90d ays  Plan of Care/Certification Expiration Date: 24      Visit Count:  17    OUTPATIENT PHYSICAL THERAPY: Treatment Note 2024       Episode  }Appt Desk           **10/17/23: Right ankle arthroscopy with extensive debridement, Right peroneus brevis tendon secondary repair, Right lateral ankle reconstruction, Brostrom procedure, Right dorsiflexion first metatarsal osteotomy**  Treatment Diagnosis:    Stiffness of right foot, not elsewhere classified  Muscle weakness (generalized)  Other abnormalities of gait and mobility  Medical/Referring Diagnosis:    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]  Peroneal tendinitis of right lower extremity [M76.71]  Referring Physician:  Casandra Rossi, NEO - CNP  MD Orders:  PT Eval and Treat   Date of Onset:  Onset Date: 10/17/23     Allergies:   Iodine and Prednisone  Restrictions/Precautions:  Restrictions/Precautions: Other (comment) (Follow Bromstrom Procedure Protocol)  No data recorded   Interventions Planned (Treatment may consist of any combination of the following):    Current Treatment Recommendations: Strengthening; ROM; Balance training; Functional mobility training; Transfer training; ADL/Self-care training; Endurance training; Gait training; Stair training; Neuromuscular re-education; Manual; Pain management; Return to work related activity; Home exercise program; Safety education & training; Patient/Caregiver education & training; Modalities; Equipment evaluation, education, & procurement; Positioning; Dry needling; Aquatics     >Subjective Comments: Pt notes he had increased pain in

## 2024-02-09 ENCOUNTER — HOSPITAL ENCOUNTER (OUTPATIENT)
Dept: PHYSICAL THERAPY | Age: 59
Setting detail: RECURRING SERIES
Discharge: HOME | End: 2024-02-12
Payer: COMMERCIAL

## 2024-02-09 ENCOUNTER — TELEPHONE (OUTPATIENT)
Dept: ORTHOPEDIC SURGERY | Age: 59
End: 2024-02-09

## 2024-02-09 PROCEDURE — 97140 MANUAL THERAPY 1/> REGIONS: CPT

## 2024-02-09 PROCEDURE — 97110 THERAPEUTIC EXERCISES: CPT

## 2024-02-09 NOTE — TELEPHONE ENCOUNTER
Patient needs to reschedule apt on 3/18 til a time after 3:00 or any Fridays, I tried to change it but the computer wouldn't let me.

## 2024-02-09 NOTE — PROGRESS NOTES
Ricardo Shaffer Jr.  : 1965  Primary: [employer] ([employer])  Secondary:  [facility] @ [place] [address] [place] [address]  Phone: [phone]  Fax: [phone] Plan Frequency: 2x a week for 90d ays    Plan of Care/Certification Expiration Date: 24      >PT Visit Info:  Plan Frequency: 2x a week for 90d ays  Plan of Care/Certification Expiration Date: 24      Visit Count:  18    OUTPATIENT PHYSICAL THERAPY: Treatment Note 2024       Episode  }Appt Desk           **10/17/23: Right ankle arthroscopy with extensive debridement, Right peroneus brevis tendon secondary repair, Right lateral ankle reconstruction, Brostrom procedure, Right dorsiflexion first metatarsal osteotomy**  Treatment Diagnosis:    Stiffness of right foot, not elsewhere classified  Muscle weakness (generalized)  Other abnormalities of gait and mobility  Medical/Referring Diagnosis:    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]  Peroneal tendinitis of right lower extremity [M76.71]  Referring Physician:  Casandra Rossi, NEO - CNP  MD Orders:  PT Eval and Treat   Date of Onset:  Onset Date: 10/17/23     Allergies:   Iodine and Prednisone  Restrictions/Precautions:  Restrictions/Precautions: Other (comment) (Follow Bromstrom Procedure Protocol)  No data recorded   Interventions Planned (Treatment may consist of any combination of the following):    Current Treatment Recommendations: Strengthening; ROM; Balance training; Functional mobility training; Transfer training; ADL/Self-care training; Endurance training; Gait training; Stair training; Neuromuscular re-education; Manual; Pain management; Return to work related activity; Home exercise program; Safety education & training; Patient/Caregiver education & training; Modalities; Equipment evaluation, education, & procurement; Positioning; Dry needling; Aquatics     >Subjective Comments: Pt notes he has been doing very well

## 2024-02-16 ENCOUNTER — HOSPITAL ENCOUNTER (OUTPATIENT)
Dept: PHYSICAL THERAPY | Age: 59
Setting detail: RECURRING SERIES
End: 2024-02-16
Payer: COMMERCIAL

## 2024-02-16 NOTE — PROGRESS NOTES
Ricardo Shaffer Jr.  : 1965  Primary: [employer]  Secondary:  [facility] @ [place] [address] [address]  [facility] [address]  Phone: [phone]  Fax: [phone]       2024      Patient cancelled today's appointment on previous date due to having to work.      Gilma Herron, PT

## 2024-02-23 ENCOUNTER — HOSPITAL ENCOUNTER (OUTPATIENT)
Dept: PHYSICAL THERAPY | Age: 59
Setting detail: RECURRING SERIES
Discharge: HOME | End: 2024-02-26
Payer: COMMERCIAL

## 2024-02-23 PROCEDURE — 97110 THERAPEUTIC EXERCISES: CPT

## 2024-02-23 PROCEDURE — 97140 MANUAL THERAPY 1/> REGIONS: CPT

## 2024-02-23 ASSESSMENT — PAIN SCALES - GENERAL: PAINLEVEL_OUTOF10: 0

## 2024-02-23 NOTE — PROGRESS NOTES
theraband for talocrural mobilization concurrently    Keegan step overs    Trunk twists on foam pad in minisquat 2x10 B  10#   Punching in minisquat on foam pad 2x10 B  5#   Marching holds 2x12 B  10#   Gastroc stretch 2min   Resisted lateral walking            [facility] Portal   Access Code: [email or web address]  URL: [email or web address]  Date: 11/21/2023  Prepared by: Gilma Herron    MANUAL THERAPY: (25 minutes): Joint mobilization, Soft tissue mobilization and Manipulation was utilized and necessary because of the patient's restricted joint motion, painful spasm, loss of articular motion and restricted motion of soft tissue.   -Supine and prone STM Rt ankle/foot complex, peroneal mm, gastroc, tibialis anterior  -Supine Rt ankle PROM with skilled joint mobilization  -Supine Rt great toe, subtalar, and talocrural mobilizations  -Prone STM gastroc, PF, and achilles    Commonly used abbreviations that may be included in this note:  STM- Soft tissue mobilization, R>L or L>R- right greater than left or vice versa, HEP - Home exercise program, CPA/UPA - Central or unilateral posterior-anterior mobilization, SLS- single leg stance, SKTC - Single leg to chest, SNAGS/NAGS- (sustained) Natural apophyseal glides, TKE- Terminal knee extension, ER- External rotation, IR- Internal rotation, B - Bilateral, sec- seconds, Lb- pounds, min - minutes, HA- Headache, OP- Over pressure, tband- theraband, fwd/bwd- Forward/Backward, TA- Transversus Abdominus, dbl- Double     NEUROMUSCULAR RE-EDUCATION: (0 minutes):    Exercise/activities per grid below to improve balance and proprioception.  Required minimal visual, verbal, and manual cues to promote dynamic balance in standing.   Date:  2/23/2024    Activity/Exercise Parameters   SLS on foam pad    Slow march holds on foam pad    Heel toe raises without UE support    Step up onto foam pad                  TREATMENT/SESSION SUMMARY:     Response to Treatment: See recert for

## 2024-02-23 NOTE — THERAPY RECERTIFICATION
ambulation. -MET 2/23/2024  Ricardo Shaffer Jr. will be able to stand >=30 minutes without reports of increased foot/ankle pain. -MET 2/23/2024  Ricardo Shaffer Jr. will be report improved score on the LEFs from 11 to 60 to indicate improvement in functional independence. -PROGRESSING 2/23/2024   Ricardo Shaffer Jr. will improve ankle strength to >=4+/5 to improve tolerance of ADLs and improve overall functional mobility. -MET 2/23/2024          Tool Used: Lower Extremity Functional Scale (LEFS)  Score:  Initial: 11/80 Most Recent: x/80 (Date: 2/23/2024)   Interpretation of Score: 20 questions each scored on a 5 point scale with 0 representing \"extreme difficulty or unable to perform\" and 4 representing \"no difficulty\".  The lower the score, the greater the functional disability. 80/80 represents no disability.  Minimal detectable change is 9 points.    Tool Used: FOOT AND ANKLE ABILITY MEASURE  Score:  Initial: 48/84 Most Recent: X (Date: -- )   Interpretation of Score: For the \"Activities of Daily Living\", there are 21 questions each scored on a 5 point scale with 0 representing \"Unable to do\" and 4 representing \"No difficulty\".  The lower the score, the greater the functional disability. 84/84 represents no disability.  Minimal detectable change is 5.7 points.  With the addition of the 8 questions in the \"Sports Subscale,\" there are 29 questions, each scored on a 5 point scale with 0 representing \"Unable to do\" and 4 representing \"No difficulty\".  The lower the score, the greater the functional disability. 116/116 represents no disability.  Minimal detectable change is 12.3 points.      MEDICAL NECESSITY:  Skilled intervention continues to be required due to above deficits affecting participation in basic ADLs and overall functional tolerance.  REASON FOR SERVICES/OTHER COMMENTS:  Patient continues to require skilled intervention due to  above deficits affecting participation in basic ADLs and

## 2024-03-04 ENCOUNTER — PATIENT MESSAGE (OUTPATIENT)
Dept: ORTHOPEDIC SURGERY | Age: 59
End: 2024-03-04

## 2024-03-05 NOTE — TELEPHONE ENCOUNTER
From: Ricardo Shaffer Jr.  To: Casandra Rossi  Sent: 3/4/2024 2:19 PM EST  Subject: Return to work without restrictions     Good Afternoon! I hope you are doing well. I was wondering if you can create a return to work unrestricted for me on my chart so I can give it to my supervisor? Thank you in advance!

## 2024-03-08 ENCOUNTER — HOSPITAL ENCOUNTER (OUTPATIENT)
Dept: PHYSICAL THERAPY | Age: 59
Setting detail: RECURRING SERIES
Discharge: HOME | End: 2024-03-11
Payer: COMMERCIAL

## 2024-03-08 PROCEDURE — 97110 THERAPEUTIC EXERCISES: CPT

## 2024-03-08 PROCEDURE — 97140 MANUAL THERAPY 1/> REGIONS: CPT

## 2024-03-08 NOTE — THERAPY DISCHARGE
degrees +8 degrees   PF 65 degrees 65 degrees   IV 25 degrees 30 degrees   EV 30 degrees 25 degrees     STRENGTH   Date:3/8/2024      Right Left   Hip Flexion 4+/5 4+/5   Knee Extension 5/5 5/5   Knee Flexion 5/5 5/5   Ankle DF 4+/5 4+/5   Ankle PF 4+/5 4+/5   Ankle IV 4+/5 4+/5   Ankle EV 4+/5 4+/5     EDEMA   Date:  3/8/2024      Activity/Exercise Right  Left   Circumference 24cm 24cm   Figure 8 56cm 56cm     FUNCTIONAL MOBILITY   Date:   3/8/2024    Transfers Independent   Gait deviations B ankle eversion and pronation   Assistive device None   Stairs Slight difficulty with descending    Bed mobility Independent     ASSESSMENT   ASSESSMENT:  Mr. Ricardo Shaffer Jr. has attended 20 physical therapy session including initial evaluation as of 3/8/2024. He has made great progress with ROM and strength. Only has pain in toe with walking greater than 30min and occasionally gets sharp pain in great toe.  Pt independent with HEP and agreeable to discharge. Pt met 4/5 short term goals and 3/4 long term goals.    PLAN   Effective Dates: 2/23/2024 TO Plan of Care/Certification Expiration Date: 04/23/24       GOALS: (Goals have been discussed and agreed upon with patient.)  SHORT-TERM FUNCTIONAL GOALS: Time Frame: 4 weeks  Ricardo Shaffer Jr. will be compliant with home exercise program within 4 weeks in order to improve active participation with management of patient's symptoms and/or functional deficits. -MET 1/9/2024  Ricardo Shaffer Jr. will report <=3/10 pain with walking >15 minutes in order to participate in daily exercise and daily activities. -MET 3/8/2024  Ricardo Shaffer Jr. will be able to stand >=15 minutes with <2/10 pain to feet in order to participate in household duties without issues/compromise.-MET 2/23/2024   Ricardo Shaffer Jr.  will improve Rt ankle dorsiflexion AROM from 0 degrees to +5 degrees in order to show improvement in ankle ROM and tolerance for functional activity.

## 2024-03-14 ASSESSMENT — PAIN SCALES - GENERAL: PAINLEVEL_OUTOF10: 0

## 2024-03-18 ENCOUNTER — OFFICE VISIT (OUTPATIENT)
Dept: ORTHOPEDIC SURGERY | Age: 59
End: 2024-03-18
Payer: COMMERCIAL

## 2024-03-18 DIAGNOSIS — M25.371 RIGHT ANKLE INSTABILITY: ICD-10-CM

## 2024-03-18 DIAGNOSIS — M76.71 PERONEAL TENDINITIS OF RIGHT LOWER EXTREMITY: Primary | ICD-10-CM

## 2024-03-18 PROCEDURE — 99213 OFFICE O/P EST LOW 20 MIN: CPT | Performed by: NURSE PRACTITIONER

## 2024-03-18 NOTE — PROGRESS NOTES
Name: Ricardo Shaffer Jr.  YOB: 1965  Gender: male  MRN: [id number]    Procedure Performed:  Right ankle arthroscopy with extensive debridement  Right peroneus brevis tendon secondary repair  Right lateral ankle reconstruction, Brostrom procedure  Right dorsiflexion first metatarsal osteotomy        Date of Procedure: 10/17/2023    Subjective: Patient reports that he is done well since his last visit almost 4 months ago.  He is very pleased with the progress that he is made through therapy and the overall appearance of his foot.  He does note that on occasion he does have still some pain although it is tolerable.      Physical Examination: All incisional areas are well-healed.  There are no signs of infection to the foot or ankle today.  He has palpable pulses and intact sensation to foot.  The ankle joint is stable to talar tilt and anterior drawer testing.  There is minimal to no swelling present today.        Imaging:   Interpretation of imaging  Right foot XR: AP, Lateral, Oblique views     ICD-10-CM    1. Peroneal tendinitis of right lower extremity  M76.71 XR FOOT RIGHT (MIN 3 VIEWS)      2. Right ankle instability  M25.371 XR FOOT RIGHT (MIN 3 VIEWS)         Report: AP, lateral, oblique x-ray of the right foot demonstrates healed osteotomy without hardware failure    Impression: Healed osteotomy without hardware failure   Casandra Rossi, APRN - CNP           Assessment:   Status post right ankle arthroscopy with peroneus brevis tendon repair and Broström procedure with dorsiflexion first metatarsal osteotomy.      Plan:   3 This is stable chronic illness/condition  Treatment at this time: Time with no intervention, he will follow-up with us on a as needed basis.  Studies ordered: NO XR needed @ Next Visit    Weight-bearing status: WBAT        Return to work/work restrictions: none  No medications given

## 2024-04-10 ASSESSMENT — ENCOUNTER SYMPTOMS: BACK PAIN: 0

## 2024-04-10 NOTE — PROGRESS NOTES
Not on file   Food Insecurity: Not on file   Transportation Needs: Not on file   Physical Activity: Not on file   Stress: Not on file   Social Connections: Not on file   Intimate Partner Violence: Not on file   Housing Stability: Not on file     Family History   Problem Relation Age of Onset    Pseudochol. Deficiency Neg Hx     Malig Hypertherm Neg Hx     Heart Attack Father     Heart Disease Father     Other Neg Hx     Post-op Nausea/Vomiting Neg Hx     Emergence Delirium Neg Hx     Post-op Cognitive Dysfunction Neg Hx     Delayed Awakening Neg Hx        Review of Systems  Constitutional:   Negative for fever.  Genitourinary:  Negative for hematuria.  Musculoskeletal:  Negative for back pain.      Urinalysis  UA - Dipstick  Results for orders placed or performed in visit on 04/11/24   AMB POC URINALYSIS DIP STICK AUTO W/O MICRO   Result Value Ref Range    Color (UA POC)      Clarity (UA POC)      Glucose, Urine, POC Negative     Bilirubin, Urine, POC Negative     KETONES, Urine, POC Negative     Specific Gravity, Urine, POC 1.015 1.001 - 1.035    Blood (UA POC) Trace     pH, Urine, POC 6.0 4.6 - 8.0    Protein, Urine, POC Negative     Urobilinogen, POC 0.2 mg/dL     Nitrite, Urine, POC Negative     Leukocyte Esterase, Urine, POC Negative        There were no vitals taken for this visit.     GENERAL: NAD, ALERT, A&O x 3, GAIT NORMAL  LUNGS: easy work of breathing  ABDOMEN: soft, non tender  NEUROLOGIC: cranial nerves 2-12 grossly intact   ILEANA: 2+ no nodule        Assessment and Plan    ICD-10-CM    1. Elevated prostate specific antigen (PSA)  R97.20 AMB POC URINALYSIS DIP STICK AUTO W/O MICRO     PSA, Diagnostic      2. Benign prostatic hyperplasia with lower urinary tract symptoms, symptom details unspecified  N40.1 AMB POC URINALYSIS DIP STICK AUTO W/O MICRO          PSA is within his normal range.  Pt. will follow up in 12 months with psa for ILEANA.    I have spent 20 minutes today reviewing previous notes, test

## 2024-04-11 ENCOUNTER — OFFICE VISIT (OUTPATIENT)
Dept: UROLOGY | Age: 59
End: 2024-04-11
Payer: COMMERCIAL

## 2024-04-11 DIAGNOSIS — N40.1 BENIGN PROSTATIC HYPERPLASIA WITH LOWER URINARY TRACT SYMPTOMS, SYMPTOM DETAILS UNSPECIFIED: ICD-10-CM

## 2024-04-11 DIAGNOSIS — R97.20 ELEVATED PROSTATE SPECIFIC ANTIGEN (PSA): Primary | ICD-10-CM

## 2024-04-11 LAB
BILIRUBIN, URINE, POC: NEGATIVE
BLOOD URINE, POC: NORMAL
GLUCOSE URINE, POC: NEGATIVE
KETONES, URINE, POC: NEGATIVE
LEUKOCYTE ESTERASE, URINE, POC: NEGATIVE
NITRITE, URINE, POC: NEGATIVE
PH, URINE, POC: 6 (ref 4.6–8)
PROTEIN,URINE, POC: NEGATIVE
SPECIFIC GRAVITY, URINE, POC: 1.01 (ref 1–1.03)
URINALYSIS CLARITY, POC: NORMAL
URINALYSIS COLOR, POC: NORMAL
UROBILINOGEN, POC: NORMAL

## 2024-04-11 PROCEDURE — 99213 OFFICE O/P EST LOW 20 MIN: CPT | Performed by: UROLOGY

## 2024-04-11 PROCEDURE — 81003 URINALYSIS AUTO W/O SCOPE: CPT | Performed by: UROLOGY

## 2025-03-13 ENCOUNTER — TELEPHONE (OUTPATIENT)
Dept: NEUROSURGERY | Age: 60
End: 2025-03-13

## 2025-03-13 DIAGNOSIS — D32.9 MENINGIOMA (HCC): Primary | ICD-10-CM

## 2025-03-13 NOTE — TELEPHONE ENCOUNTER
Per te from Kerri per Dr. Desir MRI Brain WO due at this time, let pt know bc its been 4 years since he has been here it may not be approved by insurance, pt is aware.

## 2025-03-13 NOTE — TELEPHONE ENCOUNTER
Patient was seeing Dr. Desir.    Dr. KHAN wanted pt to get checked every TWO years.    He needs needs an MRI WO,( the dye puts him in the hospital) prior to the OV of 7/15/2025.    Would you please put the order in please?    The phone # is correct.     Thank you

## 2025-04-02 DIAGNOSIS — R97.20 ELEVATED PROSTATE SPECIFIC ANTIGEN (PSA): ICD-10-CM

## 2025-04-02 LAB — PSA SERPL-MCNC: 8.9 NG/ML (ref 0–4)

## 2025-04-10 ENCOUNTER — OFFICE VISIT (OUTPATIENT)
Dept: UROLOGY | Age: 60
End: 2025-04-10
Payer: COMMERCIAL

## 2025-04-10 DIAGNOSIS — R97.20 ELEVATED PROSTATE SPECIFIC ANTIGEN (PSA): Primary | ICD-10-CM

## 2025-04-10 DIAGNOSIS — N48.6 PEYRONIE DISEASE: ICD-10-CM

## 2025-04-10 DIAGNOSIS — N40.1 BENIGN PROSTATIC HYPERPLASIA WITH LOWER URINARY TRACT SYMPTOMS, SYMPTOM DETAILS UNSPECIFIED: ICD-10-CM

## 2025-04-10 LAB
BILIRUBIN, URINE, POC: NEGATIVE
BLOOD URINE, POC: NEGATIVE
GLUCOSE URINE, POC: NEGATIVE MG/DL
KETONES, URINE, POC: NEGATIVE MG/DL
LEUKOCYTE ESTERASE, URINE, POC: NEGATIVE
NITRITE, URINE, POC: NEGATIVE
PH, URINE, POC: 7 (ref 4.6–8)
PROTEIN,URINE, POC: NEGATIVE MG/DL
SPECIFIC GRAVITY, URINE, POC: 1.01 (ref 1–1.03)
URINALYSIS CLARITY, POC: NORMAL
URINALYSIS COLOR, POC: NORMAL
UROBILINOGEN, POC: NORMAL MG/DL

## 2025-04-10 PROCEDURE — 99214 OFFICE O/P EST MOD 30 MIN: CPT | Performed by: UROLOGY

## 2025-04-10 PROCEDURE — 81003 URINALYSIS AUTO W/O SCOPE: CPT | Performed by: UROLOGY

## 2025-04-10 ASSESSMENT — ENCOUNTER SYMPTOMS: BACK PAIN: 0

## 2025-04-10 NOTE — PROGRESS NOTES
Strain: Low Risk  (3/12/2025)    Received from [employer]    Financial Resource Strain     Difficulty Paying Living Expenses: Not hard at all     Difficulty Paying Medical Expenses: Not on file   Food Insecurity: No Food Insecurity (3/12/2025)    Received from [employer]    Food Insecurity     Worried about Running Out of Food in the Last Year: Never true     Ran Out of Food in the Last Year: Never true   Transportation Needs: No Transportation Needs (3/12/2025)    Received from [employer]    Transportation Needs     Lack of Transportation: No   Physical Activity: Insufficiently Active (4/26/2024)    Received from [employer]    Physical Activity     Days of Exercise per Week: 2 days     On average, how many minutes do you exercise per day?: 10     Total Minutes of Exercise Per Week: 20   Stress: No Stress Concern Present (3/12/2025)    Received from [employer]    Stress     Feeling of Stress : Not at all   Social Connections: Moderately Integrated (3/12/2025)    Received from [employer]    Social Connections     Frequency of Communication with Friends and Family: More than three times a week     Frequency of Social Gatherings with Friends and Family: Once a week   Intimate Partner Violence: Not At Risk (3/12/2025)    Received from [employer]    Intimate Partner Violence     Fear of Current or Ex-Partner: No     Emotionally Abused: No     Physically Abused: No     Sexually Abused: No   Housing Stability: Not At Risk (3/12/2025)    Received from [employer]    Housing Stability     Was there a time when you did not have a steady place to sleep: No     Worried that the place you are staying is making you sick: No     Family History   Problem Relation Age of Onset    Pseudochol. Deficiency Neg Hx     Malig Hypertherm Neg Hx     Heart Attack Father     Heart Disease Father     Other Neg Hx     Post-op Nausea/Vomiting Neg Hx     Emergence Delirium Neg Hx     Post-op Cognitive Dysfunction Neg Hx

## 2025-06-06 ENCOUNTER — HOSPITAL ENCOUNTER (OUTPATIENT)
Dept: MRI IMAGING | Age: 60
Discharge: HOME | End: 2025-06-09
Attending: NEUROLOGICAL SURGERY
Payer: COMMERCIAL

## 2025-06-06 DIAGNOSIS — D32.9 MENINGIOMA (HCC): ICD-10-CM

## 2025-06-06 PROCEDURE — 70551 MRI BRAIN STEM W/O DYE: CPT

## 2025-06-17 ENCOUNTER — OFFICE VISIT (OUTPATIENT)
Dept: NEUROSURGERY | Age: 60
End: 2025-06-17
Payer: COMMERCIAL

## 2025-06-17 VITALS
WEIGHT: 211 LBS | SYSTOLIC BLOOD PRESSURE: 125 MMHG | TEMPERATURE: 98.1 F | HEART RATE: 82 BPM | HEIGHT: 73 IN | DIASTOLIC BLOOD PRESSURE: 83 MMHG | BODY MASS INDEX: 27.96 KG/M2 | OXYGEN SATURATION: 99 %

## 2025-06-17 DIAGNOSIS — D32.9 MENINGIOMA (HCC): Primary | ICD-10-CM

## 2025-06-17 PROCEDURE — 99205 OFFICE O/P NEW HI 60 MIN: CPT | Performed by: NEUROLOGICAL SURGERY

## 2025-06-17 NOTE — PROGRESS NOTES
[facility] OFFICE NOTE:         CC: Meningioma for review of MRI    History of Present Illness:  Ricardo Shaffer Jr. (1965) is a 60 y.o.  male who was a long-term patient of Dr. Desir's who had a craniotomy for meningioma in 2010.  He has a reaction to gadolinium and has been able to have his follow-up MRIs with contrast.  He is doing well and is having no specific problems.  He has not been seen since 2021. he presents today for review of his most recent MRI  HPI      Past Medical History:   Diagnosis Date    Arthritis     shoulders & back    CAD (coronary artery disease)     CE followed by cardiology; nuclear stress test 4/3/23 left ventricle normal perfusion, EF 67%    COVID 09/03/2023    + covid test, cough, was not hospitalized, earliest DOS 10/15/23    GERD (gastroesophageal reflux disease)     OTC med prn    H/O brain tumor     crainotomy in 2010    Hearing impaired     wears hearing aids    High cholesterol     controlled with medications    History of kidney stones 10 yrs ago    surgical intervention required    History of thyroid cancer     resulted in thyroidectomy    Hypertension     managed well with meds    Melanoma (HCC)     PONV (postoperative nausea and vomiting)     patient denied, noted CE    Thromboembolus (HCC) 7/10    left leg DVT 2 days after brain surgery    Thyroid disease     managed with Synthroid    Unspecified adverse effect of anesthesia 2 yrs ago    difficult to sedate then slow to wake up. Pt states he requires a large amount of anesthesia and as a result, pt is very slow to wake up.    Unspecified sleep apnea     does not use CPAP      Past Surgical History:   Procedure Laterality Date    ANKLE ARTHROSCOPY Right 10/17/2023    Right Peroneal tendon debridement/reconstruction with lateral ankle ligament reconstruction and right ankle arthroscopy and first metatarsal dorsiflexion osteotomy performed by Star Douglas III, MD at [facility]    COLONOSCOPY

## (undated) DEVICE — GLOVE SURG SZ 65 L12IN FNGR THK79MIL GRN LTX FREE

## (undated) DEVICE — NEEDLE HYPO 18GA L1.5IN PNK S STL HUB POLYPR SHLD REG BVL

## (undated) DEVICE — SOLUTION IRRIG 3000ML 0.9% SOD CHL USP UROMATIC PLAS CONT

## (undated) DEVICE — DRESSING PETRO W3XL8IN OIL EMUL N ADH GZ KNIT IMPREG CELOS

## (undated) DEVICE — COVER PRB W1XL11.8IN ENDOCAVITY CLR E BND NEOGUARD

## (undated) DEVICE — KIT BX PROST 20ML VI PREFIL W 10ML 10% NEUT BUFF FRMLN LEAK

## (undated) DEVICE — 2.6 DRILL

## (undated) DEVICE — SUTURE VCRL SZ 2-0 L27IN ABSRB UD L26MM CT-2 1/2 CIR J269H

## (undated) DEVICE — DRAPE C ARM W54XL84IN MINI FOR OEC 6800

## (undated) DEVICE — FOOT DR TOLLISON & WOMACK: Brand: MEDLINE INDUSTRIES, INC.

## (undated) DEVICE — KENDALL SCD EXPRESS SLEEVES, KNEE LENGTH, MEDIUM: Brand: KENDALL SCD

## (undated) DEVICE — GLOVE SURG SZ 65 CRM LTX FREE POLYISOPRENE POLYMER BEAD ANTI

## (undated) DEVICE — JELLY,LUBE,STERILE,FLIP TOP,TUBE,4-OZ: Brand: MEDLINE

## (undated) DEVICE — GOWN,SIRUS,NONRNF,SETINSLV,XL,20/CS: Brand: MEDLINE

## (undated) DEVICE — DRAPE TWL SURG 16X26IN BLU ORB04] ALLCARE INC]

## (undated) DEVICE — UTILITY MARKER,BLACK WITH LABELS: Brand: DEVON

## (undated) DEVICE — MAX-CORE® DISPOSABLE CORE BIOPSY INSTRUMENT, 18G X 25CM: Brand: MAX-CORE

## (undated) DEVICE — GLOVE SURG SZ 8 L12IN FNGR THK79MIL GRN LTX FREE

## (undated) DEVICE — DISSECTOR ENDOSCP L7MM DIA3MM FOR RESECT SM JT COOLCUT

## (undated) DEVICE — STERILE PACKED. BORE DIAMETER 1.6 MM; ANGLE OF INSERTION 19° TO THE LONG AXIS OF THE TRANSDUCER: Brand: SINGLE-USE BIPLANE BIOPSY GUIDE

## (undated) DEVICE — SPLINT THMB W4XL30IN FBRGLS PD PRECUT LTWT DURABLE FAST SET

## (undated) DEVICE — ZIMMER® STERILE DISPOSABLE TOURNIQUET CUFF WITH PLC, DUAL PORT, SINGLE BLADDER, 18 IN. (46 CM)

## (undated) DEVICE — SYRINGE MED 30ML STD CLR PLAS LUERLOCK TIP N CTRL DISP

## (undated) DEVICE — NEEDLE SPNL L3.5IN PNK HUB S STL REG WALL FIT STYL W/ QNCKE

## (undated) DEVICE — SUTURE MCRYL SZ 3-0 L27IN ABSRB UD L19MM PS-2 3/8 CIR PRIM Y427H

## (undated) DEVICE — Device

## (undated) DEVICE — TUBING PMP L16FT MAIN DISP FOR AR-6400 AR-6475